# Patient Record
Sex: MALE | Race: WHITE | NOT HISPANIC OR LATINO | Employment: OTHER | URBAN - METROPOLITAN AREA
[De-identification: names, ages, dates, MRNs, and addresses within clinical notes are randomized per-mention and may not be internally consistent; named-entity substitution may affect disease eponyms.]

---

## 2018-10-12 ENCOUNTER — TRANSCRIBE ORDERS (OUTPATIENT)
Dept: SLEEP MEDICINE | Facility: HOSPITAL | Age: 33
End: 2018-10-12

## 2018-10-12 DIAGNOSIS — G47.10 HYPERSOMNIA: Primary | ICD-10-CM

## 2018-10-12 DIAGNOSIS — G25.81 RESTLESS LEGS SYNDROME (RLS): ICD-10-CM

## 2018-10-17 ENCOUNTER — HOSPITAL ENCOUNTER (OUTPATIENT)
Dept: SLEEP MEDICINE | Facility: HOSPITAL | Age: 33
End: 2018-10-17

## 2019-09-12 ENCOUNTER — OFFICE VISIT (OUTPATIENT)
Dept: PSYCHIATRY | Facility: CLINIC | Age: 34
End: 2019-09-12

## 2019-09-12 DIAGNOSIS — F31.60 BIPOLAR AFFECTIVE DISORDER, MIXED (HCC): Primary | ICD-10-CM

## 2019-09-12 PROCEDURE — 90792 PSYCH DIAG EVAL W/MED SRVCS: CPT | Performed by: PHYSICIAN ASSISTANT

## 2019-09-12 RX ORDER — VALACYCLOVIR HYDROCHLORIDE 500 MG/1
500 TABLET, FILM COATED ORAL 3 TIMES DAILY PRN
COMMUNITY
Start: 2018-10-03

## 2019-09-12 RX ORDER — LEVOTHYROXINE SODIUM 0.07 MG/1
1 TABLET ORAL
COMMUNITY
Start: 2019-08-18 | End: 2022-06-10 | Stop reason: SDUPTHER

## 2019-09-12 RX ORDER — ARIPIPRAZOLE 10 MG/1
10 TABLET ORAL DAILY
COMMUNITY
Start: 2019-01-18 | End: 2019-09-12 | Stop reason: ALTCHOICE

## 2019-09-12 NOTE — PROGRESS NOTES
Subjective   Basil Alarcon is a 33 y.o.white male who presents today for initial evaluation     Chief Complaint:  Fatigue, yvonne, depression    History of Present Illness:   Referred here by Dr. Johnnie Nance  He and his wife are trying to have a baby,  x 1 year  Was going to counseling in [place] who pointed out he might be bipolar, stopped counseling in November  Fatigue/low energy/low motivation initially and found he was hypothyroid  Episodes where he felt great, restless, sleeping only one or two hours, irritable, wasn't himself, lasted at least two weeks then crashed into depression with no reasons  He is not on any medication at this point, no extreme highs and lows   Works at [employer], works swing shifts  Depression 0/10 currently  Denies SI/HI  Last manic episode from October to November, spent savings, bought a new truck, max out credit cards                The following portions of the patient's history were reviewed and updated as appropriate: allergies, current medications, past family history, past medical history, past social history, past surgical history and problem list.    PAST PSYCHIATRIC HISTORY  Axis I  Affective/Bipoloar Disorder  Axis II  None    PAST OUTPATIENT TREATMENT  Diagnosis treated:  Affective Disorder  Treatment Type:  Individual Therapy, Medication Management  Prior Psychiatric Medications:  Lexapro  Buspar   Abilfiy  Support Groups:  None  Sequelae Of Mental Disorder:  job disruption, family disruption, emotional distress, financial hardships      Interval History  Improved    Side Effects  Buspar triggered yvonne but helped his sexual side effects from Lexapro      Past Medical History:  Past Medical History:   Diagnosis Date   • Bipolar disorder (CMS/HCC)    • Disease of thyroid gland        Social History:  Social History     Socioeconomic History   • Marital status:      Spouse name: Not on file   • Number of children: Not on file   • Years of  education: Not on file   • Highest education level: Not on file   Tobacco Use   • Smoking status: Former Smoker   • Smokeless tobacco: Never Used   Substance and Sexual Activity   • Alcohol use: No     Frequency: Never   • Drug use: No   • Sexual activity: Yes       Family History:  Family History   Problem Relation Age of Onset   • Bipolar disorder Mother    • Bipolar disorder Sister        Past Surgical History:  History reviewed. No pertinent surgical history.    Problem List:  There is no problem list on file for this patient.      Allergy:   No Known Allergies     Discontinued Medications:  Medications Discontinued During This Encounter   Medication Reason   • ARIPiprazole (ABILIFY) 10 MG tablet Alternate therapy       Current Medications:   Current Outpatient Medications   Medication Sig Dispense Refill   • valACYclovir (VALTREX) 500 MG tablet Take 500 mg by mouth Daily.     • bismuth subsalicylate (PEPTO BISMOL) 262 MG/15ML suspension Take 15 mL by mouth.     • levothyroxine (SYNTHROID, LEVOTHROID) 75 MCG tablet        No current facility-administered medications for this visit.          Review of Symptoms:    Psychiatric/Behavioral: Negative for agitation, behavioral problems, confusion, decreased concentration, dysphoric mood, hallucinations, self-injury, sleep disturbance and suicidal ideas. The patient is not nervous/anxious and is not hyperactive.        Physical Exam:   There were no vitals taken for this visit.    Mental Status Exam:   Hygiene:   good  Cooperation:  Cooperative  Eye Contact:  Good  Psychomotor Behavior:  Appropriate  Affect:  Full range  Mood: normal  Hopelessness: Denies  Speech:  Normal  Thought Process:  Goal directed  Thought Content:  Normal  Suicidal:  None  Homicidal:  None  Hallucinations:  None  Delusion:  None  Memory:  Intact  Orientation:  Person, Place, Time and Situation  Reliability:  good  Insight:  Good  Judgement:  Good  Impulse Control:  Good  Physical/Medical  Issues:  Yes Hypothyroidism       PHQ-9 Depression Screening  Little interest or pleasure in doing things? 1   Feeling down, depressed, or hopeless? 0   Trouble falling or staying asleep, or sleeping too much?     Feeling tired or having little energy?     Poor appetite or overeating?     Feeling bad about yourself - or that you are a failure or have let yourself or your family down?     Trouble concentrating on things, such as reading the newspaper or watching television?     Moving or speaking so slowly that other people could have noticed? Or the opposite - being so fidgety or restless that you have been moving around a lot more than usual?     Thoughts that you would be better off dead, or of hurting yourself in some way?     PHQ-9 Total Score 1   If you checked off any problems, how difficult have these problems made it for you to do your work, take care of things at home, or get along with other people?             Former smoker    I advised Basil of the risks of tobacco use.     Lab Results:   No visits with results within 3 Month(s) from this visit.   Latest known visit with results is:   No results found for any previous visit.       Assessment/Plan   Problems Addressed this Visit     None      Visit Diagnoses     Bipolar affective disorder, mixed (CMS/[facility])    -  Primary          Visit Diagnoses:    ICD-10-CM ICD-9-CM   1. Bipolar affective disorder, mixed (CMS/HCC) F31.60 296.60       TREATMENT PLAN/GOALS: Continue supportive psychotherapy efforts and medications as indicated. Treatment and medication options discussed during today's visit. Patient ackowledged and verbally consented to continue with current treatment plan and was educated on the importance of compliance with treatment and follow-up appointments.    MEDICATION ISSUES:  INSPECT reviewed as expected  Discussed medication options and treatment plan of prescribed medication as well as the risks, benefits, and side effects including potential  falls, possible impaired driving and metabolic adversities among others. Patient is agreeable to call the office with any worsening of symptoms or onset of side effects. Patient is agreeable to call 911 or go to the nearest ER should he/she begin having SI/HI. No medication side effects or related complaints today.     Patient reports that his mood is currently fairly well controlled, not on any medications, no recent manic symptoms, denies feeling depressed.  He would like to try medication to prevent any major swings in his mood again but very nervous about it.  FaceRig testing done to help evaluate the best practice medications for him, will call him to discuss and decide which meds to start.    MEDS ORDERED DURING VISIT:  No orders of the defined types were placed in this encounter.      Return in about 3 months (around 12/12/2019).         This document has been electronically signed by Kristy Reese PA-C  September 12, 2019 10:01 AM

## 2019-09-27 ENCOUNTER — TELEPHONE (OUTPATIENT)
Dept: PSYCHIATRY | Facility: CLINIC | Age: 34
End: 2019-09-27

## 2019-09-27 NOTE — TELEPHONE ENCOUNTER
Please call patient and let him know that we have a few medication options for the bipolar based on his iexerci.se results, Vraylar, Latuda or Geodon.  There are many of the meds, including Abilify that had moderated to significant interactiolns and should be avoided. I know he is reluctant to start meds, so let me know if he would like me to send an Rx of a med to get started.

## 2019-10-01 ENCOUNTER — PATIENT MESSAGE (OUTPATIENT)
Dept: PSYCHIATRY | Facility: CLINIC | Age: 34
End: 2019-10-01

## 2019-10-29 ENCOUNTER — PATIENT MESSAGE (OUTPATIENT)
Dept: PSYCHIATRY | Facility: CLINIC | Age: 34
End: 2019-10-29

## 2019-10-29 NOTE — TELEPHONE ENCOUNTER
From: Basil Alarcon  To: Kristy Reese PA-C  Sent: 10/1/2019 11:55 AM EDT  Subject: Test Results Question    Hello,    I viewed the test results from the gene testing. We did speak of other forms of medications which are not on the list. I believe the test results are for antidepressants and we were discussing mood stabilizers and bi polar medocation. I researched the antidepressants however. The Emsam transdermal is the only medication that appears promising. I am mostly basing this from user reports and it seems to have the better results out of the four from the list (lowest amount side effects).    If antidepressants are where you suggest starting could we begin with the Epsam transdermal?    Thank you Basil Schmitt

## 2020-02-10 ENCOUNTER — TELEPHONE (OUTPATIENT)
Dept: PSYCHIATRY | Facility: CLINIC | Age: 35
End: 2020-02-10

## 2020-02-10 DIAGNOSIS — F31.32 BIPOLAR AFFECTIVE DISORDER, CURRENTLY DEPRESSED, MODERATE (HCC): Primary | ICD-10-CM

## 2020-02-10 NOTE — TELEPHONE ENCOUNTER
Per Provider Hugh kaur for patient to  samples of vraylar 1.5 mg .Patient came in to  samples 3 week supply of vraylar

## 2020-02-27 ENCOUNTER — OFFICE VISIT (OUTPATIENT)
Dept: PSYCHIATRY | Facility: CLINIC | Age: 35
End: 2020-02-27

## 2020-02-27 DIAGNOSIS — F51.05 INSOMNIA DUE TO MENTAL DISORDER: ICD-10-CM

## 2020-02-27 DIAGNOSIS — F31.60 BIPOLAR AFFECTIVE DISORDER, MIXED (HCC): Primary | ICD-10-CM

## 2020-02-27 DIAGNOSIS — F41.1 GENERALIZED ANXIETY DISORDER: ICD-10-CM

## 2020-02-27 PROBLEM — A60.00 GENITAL HERPES: Status: ACTIVE | Noted: 2019-02-08

## 2020-02-27 PROBLEM — E03.9 HYPOTHYROIDISM, ADULT: Status: ACTIVE | Noted: 2018-08-28

## 2020-02-27 PROCEDURE — 99213 OFFICE O/P EST LOW 20 MIN: CPT | Performed by: PHYSICIAN ASSISTANT

## 2020-02-27 RX ORDER — OXCARBAZEPINE 150 MG/1
150 TABLET, FILM COATED ORAL 2 TIMES DAILY
Qty: 60 TABLET | Refills: 3 | Status: SHIPPED | OUTPATIENT
Start: 2020-02-27 | End: 2020-04-28 | Stop reason: SDUPTHER

## 2020-02-27 RX ORDER — TRAZODONE HYDROCHLORIDE 50 MG/1
TABLET ORAL
Qty: 60 TABLET | Refills: 3 | Status: SHIPPED | OUTPATIENT
Start: 2020-02-27 | End: 2020-05-20

## 2020-02-27 NOTE — PROGRESS NOTES
Subjective   Basil Alarcon is a 34 y.o.white male who presents today for first follow up    Chief Complaint:  Fatigue, yvonne, depression    History of Present Illness:   Vraylar helping calm him a lot, feels better, no side effects, would like to keep it at 1.5mg for now  Trouble sleeping, still manic episodes, mood swings  He and his wife are trying to have a baby,  x 1 year  Was going to counseling in [place] who pointed out he might be bipolar, stopped counseling in November  Depression 3/10  Works at [employer], works swing shifts, supervisor, stressful job  Denies SI/HI  Last severe manic episode from October to November, spent savings, bought a new truck, max out credit cards                The following portions of the patient's history were reviewed and updated as appropriate: allergies, current medications, past family history, past medical history, past social history, past surgical history and problem list.    PAST PSYCHIATRIC HISTORY  Axis I  Affective/Bipoloar Disorder, Anxiety Disorder  Axis II  None    PAST OUTPATIENT TREATMENT  Diagnosis treated:  Affective Disorder, Anxiety Disorder  Treatment Type:  Individual Therapy, Medication Management  Prior Psychiatric Medications:  Prozac, depression worse  Paxil  Lexapro  Buspar   Abilfiy  Ambien, felt weird  Support Groups:  None  Sequelae Of Mental Disorder:  job disruption, family disruption, emotional distress, financial hardships      Interval History  Improved    Side Effects  Buspar triggered yvonne but helped his sexual side effects from Lexapro    Past psychiatric history reviewed and compared to 9/12/1 on visit and appropriate updates were made.    Past Medical History:  Past Medical History:   Diagnosis Date   • Bipolar disorder (CMS/[facility])    • Disease of thyroid gland        Social History:  Social History     Socioeconomic History   • Marital status:      Spouse name: Not on file   • Number of children: Not on file   •  Years of education: Not on file   • Highest education level: Not on file   Tobacco Use   • Smoking status: Former Smoker   • Smokeless tobacco: Never Used   Substance and Sexual Activity   • Alcohol use: No     Frequency: Never   • Drug use: No   • Sexual activity: Yes       Family History:  Family History   Problem Relation Age of Onset   • Bipolar disorder Mother    • Bipolar disorder Sister        Past Surgical History:  History reviewed. No pertinent surgical history.    Problem List:  Patient Active Problem List   Diagnosis   • Generalized anxiety disorder   • Genital herpes   • Hypercholesterolemia   • Hypothyroidism, adult       Allergy:   No Known Allergies     Discontinued Medications:  Medications Discontinued During This Encounter   Medication Reason   • Cariprazine HCl (VRAYLAR) 1.5 MG capsule capsule Reorder       Current Medications:   Current Outpatient Medications   Medication Sig Dispense Refill   • bismuth subsalicylate (PEPTO BISMOL) 262 MG/15ML suspension Take 15 mL by mouth.     • Cariprazine HCl (VRAYLAR) 1.5 MG capsule capsule Take 1 capsule by mouth Daily. 30 capsule 3   • levothyroxine (SYNTHROID, LEVOTHROID) 75 MCG tablet      • OXcarbazepine (TRILEPTAL) 150 MG tablet Take 1 tablet by mouth 2 (Two) Times a Day. 60 tablet 3   • traZODone (DESYREL) 50 MG tablet Take one or two at bedtime for sleep 60 tablet 3   • valACYclovir (VALTREX) 500 MG tablet Take 500 mg by mouth Daily.       No current facility-administered medications for this visit.          Review of Symptoms:    Psychiatric/Behavioral: Negative for agitation, behavioral problems, confusion, decreased concentration, dysphoric mood, hallucinations, self-injury, sleep disturbance and suicidal ideas. The patient is not nervous/anxious and is not hyperactive.        Physical Exam:   There were no vitals taken for this visit.    Mental Status Exam:   Hygiene:   good  Cooperation:  Cooperative  Eye Contact:  Good  Psychomotor Behavior:   Appropriate  Affect:  Full range  Mood: normal  Hopelessness: Denies  Speech:  Normal  Thought Process:  Goal directed  Thought Content:  Normal  Suicidal:  None  Homicidal:  None  Hallucinations:  None  Delusion:  None  Memory:  Intact  Orientation:  Person, Place, Time and Situation  Reliability:  good  Insight:  Good  Judgement:  Good  Impulse Control:  Good  Physical/Medical Issues:  Yes Hypothyroidism     Mental status exam was reviewed and compared to 9/12/1 on visit and no updates were necessary, the exam was the same.    PHQ-9 Depression Screening  Little interest or pleasure in doing things? 1   Feeling down, depressed, or hopeless? 1   Trouble falling or staying asleep, or sleeping too much? 2   Feeling tired or having little energy? 1   Poor appetite or overeating? 0   Feeling bad about yourself - or that you are a failure or have let yourself or your family down? 1   Trouble concentrating on things, such as reading the newspaper or watching television? 1   Moving or speaking so slowly that other people could have noticed? Or the opposite - being so fidgety or restless that you have been moving around a lot more than usual? 0   Thoughts that you would be better off dead, or of hurting yourself in some way? 0   PHQ-9 Total Score 7   If you checked off any problems, how difficult have these problems made it for you to do your work, take care of things at home, or get along with other people? Somewhat difficult           Former smoker    I advised Basil of the risks of tobacco use.     Lab Results:   No visits with results within 3 Month(s) from this visit.   Latest known visit with results is:   No results found for any previous visit.       Assessment/Plan   Problems Addressed this Visit        Other    Generalized anxiety disorder    Relevant Medications    traZODone (DESYREL) 50 MG tablet    Cariprazine HCl (VRAYLAR) 1.5 MG capsule capsule      Other Visit Diagnoses     Bipolar affective disorder, mixed  (CMS/[facility])    -  Primary    Relevant Medications    traZODone (DESYREL) 50 MG tablet    OXcarbazepine (TRILEPTAL) 150 MG tablet    Cariprazine HCl (VRAYLAR) 1.5 MG capsule capsule    Insomnia due to mental disorder        Relevant Medications    traZODone (DESYREL) 50 MG tablet    Cariprazine HCl (VRAYLAR) 1.5 MG capsule capsule          Visit Diagnoses:    ICD-10-CM ICD-9-CM   1. Bipolar affective disorder, mixed (CMS/[facility]) F31.60 296.60   2. Generalized anxiety disorder F41.1 300.02   3. Insomnia due to mental disorder F51.05 300.9     327.02       TREATMENT PLAN/GOALS: Continue supportive psychotherapy efforts and medications as indicated. Treatment and medication options discussed during today's visit. Patient ackowledged and verbally consented to continue with current treatment plan and was educated on the importance of compliance with treatment and follow-up appointments.    MEDICATION ISSUES:  INSPECT reviewed as expected  Discussed medication options and treatment plan of prescribed medication as well as the risks, benefits, and side effects including potential falls, possible impaired driving and metabolic adversities among others. Patient is agreeable to call the office with any worsening of symptoms or onset of side effects. Patient is agreeable to call 911 or go to the nearest ER should he/she begin having SI/HI. No medication side effects or related complaints today.     [employer] results were reviewed and he was given a copy of his results.  Continue Vraylar at  1.5 mg daily  Add Trileptal 150 mg, take 1 tablet daily for 2 weeks then increase to twice daily  Trazodone 50 mg tablets, take 1 or 2 at bedtime for sleep  Patient is a reduced converter of folic acid, will consider supplementation with l-methylfolate down the road.    MEDS ORDERED DURING VISIT:  New Medications Ordered This Visit   Medications   • traZODone (DESYREL) 50 MG tablet     Sig: Take one or two at bedtime for sleep     Dispense:  60  tablet     Refill:  3   • OXcarbazepine (TRILEPTAL) 150 MG tablet     Sig: Take 1 tablet by mouth 2 (Two) Times a Day.     Dispense:  60 tablet     Refill:  3   • Cariprazine HCl (VRAYLAR) 1.5 MG capsule capsule     Sig: Take 1 capsule by mouth Daily.     Dispense:  30 capsule     Refill:  3       Return in about 3 months (around 5/27/2020).         This document has been electronically signed by Kristy Reese PA-C  February 27, 2020 8:39 AM

## 2020-04-28 ENCOUNTER — PATIENT MESSAGE (OUTPATIENT)
Dept: PSYCHIATRY | Facility: CLINIC | Age: 35
End: 2020-04-28

## 2020-04-28 DIAGNOSIS — F31.60 BIPOLAR AFFECTIVE DISORDER, MIXED (HCC): ICD-10-CM

## 2020-04-28 RX ORDER — OXCARBAZEPINE 300 MG/1
300 TABLET, FILM COATED ORAL 2 TIMES DAILY
Qty: 60 TABLET | Refills: 2 | Status: SHIPPED | OUTPATIENT
Start: 2020-04-28 | End: 2020-07-01 | Stop reason: SDUPTHER

## 2020-04-28 NOTE — TELEPHONE ENCOUNTER
From: Basil Alarcon  To: Kristy Reese PA-C  Sent: 4/28/2020 4:45 AM EDT  Subject: Non-Urgent Medical Question    Hello,    I believe the mdeication is working but i feel i am still experiencing symptoms regularly. They are not bad, but i wanted to know of we could try increasing the dose of the medicine i am on?    Thank you,  Basil

## 2020-05-20 ENCOUNTER — OFFICE VISIT (OUTPATIENT)
Dept: PSYCHIATRY | Facility: CLINIC | Age: 35
End: 2020-05-20

## 2020-05-20 DIAGNOSIS — F31.60 BIPOLAR AFFECTIVE DISORDER, MIXED (HCC): Primary | ICD-10-CM

## 2020-05-20 DIAGNOSIS — F51.05 INSOMNIA DUE TO MENTAL DISORDER: ICD-10-CM

## 2020-05-20 DIAGNOSIS — F41.1 GENERALIZED ANXIETY DISORDER: ICD-10-CM

## 2020-05-20 PROCEDURE — 99442 PR PHYS/QHP TELEPHONE EVALUATION 11-20 MIN: CPT | Performed by: PHYSICIAN ASSISTANT

## 2020-05-20 NOTE — PROGRESS NOTES
"Ramo Alarcon is a 34 y.o.white male who presents today for first follow up    You have chosen to receive care through a telephone visit. Do you consent to use a telephone visit for your medical care today? Yes    Chief Complaint:  Meds working well, bipolar significantly improve    History of Present Illness:   Vraylar helping calm him a lot, feels better, no side effects, increased to 3mg a month ago  Sleeping \"like a baby\"  No yvonne, no mood swings  Depression 0-2/10  Anxiety was sometimes overwhelming but better in the last wk.  Works at [employer], works swing shifts, supervisor, stressful job  Denies SI/HI  He never did take the Trazodone and now not needed.  Last severe manic episode from October to November 2019, spent savings, bought a new truck, max out credit cards                The following portions of the patient's history were reviewed and updated as appropriate: allergies, current medications, past family history, past medical history, past social history, past surgical history and problem list.    PAST PSYCHIATRIC HISTORY  Axis I  Affective/Bipoloar Disorder, Anxiety Disorder  Axis II  None    PAST OUTPATIENT TREATMENT  Diagnosis treated:  Affective Disorder, Anxiety Disorder  Treatment Type:  Individual Therapy, Medication Management  Prior Psychiatric Medications:  Prozac, depression worse  Paxil  Lexapro  Buspar   Abilfiy  Ambien, felt weird  Vraylar  Trileptal  Support Groups:  None  Sequelae Of Mental Disorder:  job disruption, family disruption, emotional distress, financial hardships      Interval History  Improved    Side Effects  Buspar triggered yvonne but helped his sexual side effects from Lexapro    Past psychiatric history reviewed and compared to 2/27/20 visit and appropriate updates were made.    Past Medical History:  Past Medical History:   Diagnosis Date   • Bipolar disorder (CMS/[facility])    • Disease of thyroid gland        Social History:  Social History "     Socioeconomic History   • Marital status:      Spouse name: Not on file   • Number of children: Not on file   • Years of education: Not on file   • Highest education level: Not on file   Tobacco Use   • Smoking status: Former Smoker   • Smokeless tobacco: Never Used   Substance and Sexual Activity   • Alcohol use: No     Frequency: Never   • Drug use: No   • Sexual activity: Yes       Family History:  Family History   Problem Relation Age of Onset   • Bipolar disorder Mother    • Bipolar disorder Sister        Past Surgical History:  History reviewed. No pertinent surgical history.    Problem List:  Patient Active Problem List   Diagnosis   • Generalized anxiety disorder   • Genital herpes   • Hypercholesterolemia   • Hypothyroidism, adult       Allergy:   No Known Allergies     Discontinued Medications:  Medications Discontinued During This Encounter   Medication Reason   • traZODone (DESYREL) 50 MG tablet *Therapy completed       Current Medications:   Current Outpatient Medications   Medication Sig Dispense Refill   • bismuth subsalicylate (PEPTO BISMOL) 262 MG/15ML suspension Take 15 mL by mouth.     • Cariprazine HCl (VRAYLAR) 3 MG capsule capsule Take 1 capsule by mouth Daily. 30 capsule 2   • levothyroxine (SYNTHROID, LEVOTHROID) 75 MCG tablet      • OXcarbazepine (TRILEPTAL) 300 MG tablet Take 1 tablet by mouth 2 (Two) Times a Day. 60 tablet 2   • valACYclovir (VALTREX) 500 MG tablet Take 500 mg by mouth Daily.       No current facility-administered medications for this visit.          Review of Symptoms:    Psychiatric/Behavioral: Negative for agitation, behavioral problems, confusion, decreased concentration, dysphoric mood, hallucinations, self-injury, sleep disturbance and suicidal ideas. The patient is not nervous/anxious and is not hyperactive.        Physical Exam:   There were no vitals taken for this visit.    Mental Status Exam:   Hygiene:   Unable to assess via telephone  Cooperation:   Cooperative  Eye Contact:  No eye contact via telephone  Psychomotor Behavior:  Appropriate  Affect:  Full range  Mood: normal  Hopelessness: Denies  Speech:  Normal  Thought Process:  Goal directed  Thought Content:  Normal  Suicidal:  None  Homicidal:  None  Hallucinations:  None  Delusion:  None  Memory:  Intact  Orientation:  Person, Place, Time and Situation  Reliability:  good  Insight:  Good  Judgement:  Good  Impulse Control:  Good  Physical/Medical Issues:  Yes Hypothyroidism     Mental status exam was reviewed and compared to 2/27/20 visit and appropriate updates were made.    PHQ-9 Depression Screening  Little interest or pleasure in doing things? 0   Feeling down, depressed, or hopeless? 0   Trouble falling or staying asleep, or sleeping too much?     Feeling tired or having little energy?     Poor appetite or overeating?     Feeling bad about yourself - or that you are a failure or have let yourself or your family down?     Trouble concentrating on things, such as reading the newspaper or watching television?     Moving or speaking so slowly that other people could have noticed? Or the opposite - being so fidgety or restless that you have been moving around a lot more than usual?     Thoughts that you would be better off dead, or of hurting yourself in some way?     PHQ-9 Total Score 0   If you checked off any problems, how difficult have these problems made it for you to do your work, take care of things at home, or get along with other people?             Former smoker    I advised Basil of the risks of tobacco use.     Lab Results:   No visits with results within 3 Month(s) from this visit.   Latest known visit with results is:   No results found for any previous visit.       Assessment/Plan   Problems Addressed this Visit        Other    Generalized anxiety disorder      Other Visit Diagnoses     Bipolar affective disorder, mixed (CMS/HCC)    -  Primary    Insomnia due to mental disorder               Visit Diagnoses:    ICD-10-CM ICD-9-CM   1. Bipolar affective disorder, mixed (CMS/[facility]) F31.60 296.60   2. Generalized anxiety disorder F41.1 300.02   3. Insomnia due to mental disorder F51.05 300.9     327.02       TREATMENT PLAN/GOALS: Continue supportive psychotherapy efforts and medications as indicated. Treatment and medication options discussed during today's visit. Patient ackowledged and verbally consented to continue with current treatment plan and was educated on the importance of compliance with treatment and follow-up appointments.    MEDICATION ISSUES:  INSPECT reviewed as expected  Discussed medication options and treatment plan of prescribed medication as well as the risks, benefits, and side effects including potential falls, possible impaired driving and metabolic adversities among others. Patient is agreeable to call the office with any worsening of symptoms or onset of side effects. Patient is agreeable to call 911 or go to the nearest ER should he/she begin having SI/HI. No medication side effects or related complaints today.     Patient is doing great on Vraylar 3mg and Trileptal 300mg BID.  No yvonne, sleeping well, no need for changes.  Continue current meds with no refills needed today.  Patient is a reduced converter of folic acid, will consider supplementation with l-methylfolate down the road.    MEDS ORDERED DURING VISIT:  No orders of the defined types were placed in this encounter.      Return in about 3 months (around 8/20/2020).    This visit has been rescheduled as a phone visit to comply with patient safety concerns in accordance with CDC recommendations. Total time of discussion was 15 minutes.      This document has been electronically signed by Kristy Reese PA-C  May 20, 2020 08:30

## 2020-06-30 ENCOUNTER — PATIENT MESSAGE (OUTPATIENT)
Dept: PSYCHIATRY | Facility: CLINIC | Age: 35
End: 2020-06-30

## 2020-06-30 DIAGNOSIS — F31.60 BIPOLAR AFFECTIVE DISORDER, MIXED (HCC): ICD-10-CM

## 2020-07-01 RX ORDER — OXCARBAZEPINE 300 MG/1
300 TABLET, FILM COATED ORAL 2 TIMES DAILY
Qty: 60 TABLET | Refills: 2 | Status: SHIPPED | OUTPATIENT
Start: 2020-07-01 | End: 2020-10-04

## 2020-07-01 RX ORDER — HYDROXYZINE PAMOATE 25 MG/1
25 CAPSULE ORAL 3 TIMES DAILY PRN
Qty: 90 CAPSULE | Refills: 2 | Status: SHIPPED | OUTPATIENT
Start: 2020-07-01 | End: 2020-09-29

## 2020-07-01 NOTE — TELEPHONE ENCOUNTER
From: Basil Alarcon  To: Kristy Reese PA-C  Sent: 6/30/2020 4:09 PM EDT  Subject: Visit Follow-Up Question    Hi Dr. Reese,    I hope you are well. I have two questions. First, last we spoke over the phone you mentioned I could try an anti anxiety medicine. Would that still be an option for anxiety?    Second, could you send my perscriptions to the [facility] on [facility]? [address] [place] [address] is the address.    Thank you and have a nice day,  Basil

## 2020-07-28 ENCOUNTER — PATIENT MESSAGE (OUTPATIENT)
Dept: PSYCHIATRY | Facility: CLINIC | Age: 35
End: 2020-07-28

## 2020-07-28 DIAGNOSIS — F41.1 GENERALIZED ANXIETY DISORDER: Primary | ICD-10-CM

## 2020-08-05 DIAGNOSIS — F31.60 BIPOLAR AFFECTIVE DISORDER, MIXED (HCC): Primary | ICD-10-CM

## 2020-08-10 RX ORDER — CLONAZEPAM 0.5 MG/1
0.5 TABLET ORAL 2 TIMES DAILY PRN
Qty: 60 TABLET | Refills: 0 | Status: SHIPPED | OUTPATIENT
Start: 2020-08-10 | End: 2021-08-19 | Stop reason: SDUPTHER

## 2020-08-10 NOTE — TELEPHONE ENCOUNTER
From: Basil Alarcon  To: Kristy Reese PA-C  Sent: 7/28/2020 6:09 PM EDT  Subject: Prescription Question    I have been dealing with a low heart rate and low blood pressure since I started taking the Vraylar 3mg (it may be something else?). My regular doctor asked that we go back to 1.5 to see if there is an improvement. I cannot say why or where the issue is coming from.    What do you think?    Thank you,  Basil

## 2020-08-17 ENCOUNTER — OFFICE VISIT (OUTPATIENT)
Dept: PSYCHIATRY | Facility: CLINIC | Age: 35
End: 2020-08-17

## 2020-08-17 DIAGNOSIS — F41.1 GENERALIZED ANXIETY DISORDER: ICD-10-CM

## 2020-08-17 DIAGNOSIS — F51.05 INSOMNIA DUE TO MENTAL DISORDER: ICD-10-CM

## 2020-08-17 DIAGNOSIS — F31.60 BIPOLAR AFFECTIVE DISORDER, MIXED (HCC): Primary | ICD-10-CM

## 2020-08-17 PROCEDURE — 99213 OFFICE O/P EST LOW 20 MIN: CPT | Performed by: PHYSICIAN ASSISTANT

## 2020-08-17 NOTE — PROGRESS NOTES
Subjective   Basil Alarcon is a 34 y.o.white male who presents today for first follow up    You have chosen to receive care through a telephone visit. Do you consent to use a telephone visit for your medical care today? Yes    Chief Complaint:  Became rather manic a week ago when Vraylar was increased but better now    History of Present Illness:   Wife left him, came back but sleeping in the basement, breaking up, he is ok with it  Vraylar was decreased to 1.5 mg per recommendation of his PCP, adjusting to the dosage now.  Passed out at work, was having panic attacks  Depression 3/10  Anxiety was sometimes overwhelming but better in the last wk.  Works at [employer], works swing shifts, supervisor, stressful job  Denies SI/HI  Klonopin is helping his anxiety a lot, only taking as needed               The following portions of the patient's history were reviewed and updated as appropriate: allergies, current medications, past family history, past medical history, past social history, past surgical history and problem list.    PAST PSYCHIATRIC HISTORY  Axis I  Affective/Bipoloar Disorder, Anxiety Disorder  Axis II  None    PAST OUTPATIENT TREATMENT  Diagnosis treated:  Affective Disorder, Anxiety Disorder  Treatment Type:  Individual Therapy, Medication Management  Prior Psychiatric Medications:  Prozac, depression worse  Paxil  Lexapro  Buspar   Abilfiy  Ambien, felt weird  Vraylar  Trileptal  Klonopin  Support Groups:  None  Sequelae Of Mental Disorder:  job disruption, family disruption, emotional distress, financial hardships      Interval History  Improved    Side Effects  Buspar triggered yvonne but helped his sexual side effects from Lexapro    Past psychiatric history reviewed and compared to 5/20/20 visit and appropriate updates were made.    Past Medical History:  Past Medical History:   Diagnosis Date   • Bipolar disorder (CMS/HCC)    • Disease of thyroid gland        Social History:  Social History      Socioeconomic History   • Marital status:      Spouse name: Not on file   • Number of children: Not on file   • Years of education: Not on file   • Highest education level: Not on file   Tobacco Use   • Smoking status: Former Smoker   • Smokeless tobacco: Never Used   Substance and Sexual Activity   • Alcohol use: No     Frequency: Never   • Drug use: No   • Sexual activity: Yes       Family History:  Family History   Problem Relation Age of Onset   • Bipolar disorder Mother    • Bipolar disorder Sister        Past Surgical History:  History reviewed. No pertinent surgical history.    Problem List:  Patient Active Problem List   Diagnosis   • Generalized anxiety disorder   • Genital herpes   • Hypercholesterolemia   • Hypothyroidism, adult       Allergy:   No Known Allergies     Discontinued Medications:  There are no discontinued medications.    Current Medications:   Current Outpatient Medications   Medication Sig Dispense Refill   • bismuth subsalicylate (PEPTO BISMOL) 262 MG/15ML suspension Take 15 mL by mouth.     • Cariprazine HCl (Vraylar) 1.5 MG capsule capsule Take 1 capsule by mouth Daily. 30 capsule 2   • clonazePAM (KlonoPIN) 0.5 MG tablet Take 1 tablet by mouth 2 (Two) Times a Day As Needed for Anxiety. 60 tablet 0   • hydrOXYzine pamoate (Vistaril) 25 MG capsule Take 1 capsule by mouth 3 (Three) Times a Day As Needed for Anxiety. 90 capsule 2   • levothyroxine (SYNTHROID, LEVOTHROID) 75 MCG tablet      • OXcarbazepine (TRILEPTAL) 300 MG tablet Take 1 tablet by mouth 2 (Two) Times a Day. 60 tablet 2   • valACYclovir (VALTREX) 500 MG tablet Take 500 mg by mouth Daily.       No current facility-administered medications for this visit.          Review of Symptoms:    Psychiatric/Behavioral: Negative for agitation, behavioral problems, confusion, decreased concentration, dysphoric mood, hallucinations, self-injury, sleep disturbance and suicidal ideas. The patient is not nervous/anxious and is  not hyperactive.        Physical Exam:   There were no vitals taken for this visit.    Mental Status Exam:   Hygiene:   Unable to assess via telephone  Cooperation:  Cooperative  Eye Contact:  No eye contact via telephone  Psychomotor Behavior:  Appropriate  Affect:  Full range  Mood: normal  Hopelessness: Denies  Speech:  Normal  Thought Process:  Goal directed  Thought Content:  Normal  Suicidal:  None  Homicidal:  None  Hallucinations:  None  Delusion:  None  Memory:  Intact  Orientation:  Person, Place, Time and Situation  Reliability:  good  Insight:  Good  Judgement:  Good  Impulse Control:  Good  Physical/Medical Issues:  Yes Hypothyroidism     Mental status exam was reviewed and compared to 5/20/20 visit and appropriate updates were made.    PHQ-9 Depression Screening  Little interest or pleasure in doing things? 0   Feeling down, depressed, or hopeless? 1   Trouble falling or staying asleep, or sleeping too much?     Feeling tired or having little energy?     Poor appetite or overeating?     Feeling bad about yourself - or that you are a failure or have let yourself or your family down?     Trouble concentrating on things, such as reading the newspaper or watching television?     Moving or speaking so slowly that other people could have noticed? Or the opposite - being so fidgety or restless that you have been moving around a lot more than usual?     Thoughts that you would be better off dead, or of hurting yourself in some way?     PHQ-9 Total Score 1   If you checked off any problems, how difficult have these problems made it for you to do your work, take care of things at home, or get along with other people?             Former smoker    I advised Basil of the risks of tobacco use.     Lab Results:   No visits with results within 3 Month(s) from this visit.   Latest known visit with results is:   No results found for any previous visit.       Assessment/Plan   Problems Addressed this Visit        Other     Generalized anxiety disorder      Other Visit Diagnoses     Bipolar affective disorder, mixed (CMS/HCC)    -  Primary    Insomnia due to mental disorder              Visit Diagnoses:    ICD-10-CM ICD-9-CM   1. Bipolar affective disorder, mixed (CMS/HCC) F31.60 296.60   2. Generalized anxiety disorder F41.1 300.02   3. Insomnia due to mental disorder F51.05 300.9     327.02       TREATMENT PLAN/GOALS: Continue supportive psychotherapy efforts and medications as indicated. Treatment and medication options discussed during today's visit. Patient ackowledged and verbally consented to continue with current treatment plan and was educated on the importance of compliance with treatment and follow-up appointments.    MEDICATION ISSUES:  INSPECT reviewed as expected  Discussed medication options and treatment plan of prescribed medication as well as the risks, benefits, and side effects including potential falls, possible impaired driving and metabolic adversities among others. Patient is agreeable to call the office with any worsening of symptoms or onset of side effects. Patient is agreeable to call 911 or go to the nearest ER should he/she begin having SI/HI. No medication side effects or related complaints today.     Patient is doing better now, had some manic symptoms when the Vraylar was decreased to 1.5 mg, but has adjusted now.    Continue Vraylar at 1.5 mg, Trileptal 300 twice daily, Klonopin as needed and Vistaril as needed, no refills needed today.  Patient is a reduced converter of folic acid, will consider supplementation with l-methylfolate down the road.    MEDS ORDERED DURING VISIT:  No orders of the defined types were placed in this encounter.      Return in about 4 months (around 12/17/2020).    This visit has been rescheduled as a phone visit to comply with patient safety concerns in accordance with CDC recommendations. Total time of discussion was 15 minutes.      This document has been electronically  signed by Kristy Reese PA-C  August 17, 2020 09:09

## 2020-09-06 ENCOUNTER — PATIENT MESSAGE (OUTPATIENT)
Dept: PSYCHIATRY | Facility: CLINIC | Age: 35
End: 2020-09-06

## 2020-09-06 DIAGNOSIS — F31.60 BIPOLAR AFFECTIVE DISORDER, MIXED (HCC): ICD-10-CM

## 2020-09-08 NOTE — TELEPHONE ENCOUNTER
From: Basil Alarcon  To: Kristy Reese PA-C  Sent: 9/6/2020 7:26 PM EDT  Subject: Prescription Question    Hello,    The Vraylar 1.5 mg went to the wrong pharmacy. Unfortunately, I cannot make it within their hours of operation to pick it up. Could you send it to the [facility] at [address] [place] [address]?    Thank you,  Basil

## 2020-09-29 RX ORDER — HYDROXYZINE PAMOATE 25 MG/1
25 CAPSULE ORAL 3 TIMES DAILY PRN
Qty: 90 CAPSULE | Refills: 2 | Status: SHIPPED | OUTPATIENT
Start: 2020-09-29 | End: 2022-03-18

## 2020-10-02 DIAGNOSIS — F31.60 BIPOLAR AFFECTIVE DISORDER, MIXED (HCC): ICD-10-CM

## 2020-10-04 RX ORDER — OXCARBAZEPINE 300 MG/1
TABLET, FILM COATED ORAL
Qty: 60 TABLET | Refills: 2 | Status: SHIPPED | OUTPATIENT
Start: 2020-10-04 | End: 2020-11-05

## 2020-11-05 DIAGNOSIS — F31.60 BIPOLAR AFFECTIVE DISORDER, MIXED (HCC): ICD-10-CM

## 2020-11-05 RX ORDER — OXCARBAZEPINE 300 MG/1
TABLET, FILM COATED ORAL
Qty: 60 TABLET | Refills: 2 | Status: SHIPPED | OUTPATIENT
Start: 2020-11-05 | End: 2021-03-29

## 2021-02-18 DIAGNOSIS — F31.60 BIPOLAR AFFECTIVE DISORDER, MIXED (HCC): ICD-10-CM

## 2021-02-18 RX ORDER — OXCARBAZEPINE 300 MG/1
TABLET, FILM COATED ORAL
Qty: 180 TABLET | Refills: 0 | OUTPATIENT
Start: 2021-02-18

## 2021-03-27 DIAGNOSIS — F31.60 BIPOLAR AFFECTIVE DISORDER, MIXED (HCC): ICD-10-CM

## 2021-03-27 DIAGNOSIS — F41.1 GENERALIZED ANXIETY DISORDER: ICD-10-CM

## 2021-03-29 RX ORDER — CLONAZEPAM 0.5 MG/1
0.5 TABLET ORAL 2 TIMES DAILY PRN
Qty: 60 TABLET | Refills: 0 | OUTPATIENT
Start: 2021-03-29

## 2021-03-29 RX ORDER — OXCARBAZEPINE 300 MG/1
TABLET, FILM COATED ORAL
Qty: 180 TABLET | Refills: 0 | Status: SHIPPED | OUTPATIENT
Start: 2021-03-29 | End: 2021-06-24

## 2021-03-29 RX ORDER — OXCARBAZEPINE 300 MG/1
300 TABLET, FILM COATED ORAL 2 TIMES DAILY
Qty: 60 TABLET | Refills: 2 | OUTPATIENT
Start: 2021-03-29

## 2021-04-08 RX ORDER — ZIPRASIDONE HYDROCHLORIDE 20 MG/1
20 CAPSULE ORAL 2 TIMES DAILY WITH MEALS
Qty: 60 CAPSULE | Refills: 1 | Status: SHIPPED | OUTPATIENT
Start: 2021-04-08 | End: 2021-06-08

## 2021-04-09 DIAGNOSIS — F31.60 BIPOLAR AFFECTIVE DISORDER, MIXED (HCC): ICD-10-CM

## 2021-04-09 RX ORDER — CARIPRAZINE 1.5 MG/1
CAPSULE, GELATIN COATED ORAL
Qty: 30 CAPSULE | Refills: 2 | OUTPATIENT
Start: 2021-04-09

## 2021-06-08 RX ORDER — ZIPRASIDONE HYDROCHLORIDE 20 MG/1
CAPSULE ORAL
Qty: 60 CAPSULE | Refills: 1 | Status: SHIPPED | OUTPATIENT
Start: 2021-06-08 | End: 2021-08-11

## 2021-06-24 DIAGNOSIS — F31.60 BIPOLAR AFFECTIVE DISORDER, MIXED (HCC): ICD-10-CM

## 2021-06-24 RX ORDER — OXCARBAZEPINE 300 MG/1
TABLET, FILM COATED ORAL
Qty: 180 TABLET | Refills: 0 | Status: SHIPPED | OUTPATIENT
Start: 2021-06-24 | End: 2021-08-19 | Stop reason: SDUPTHER

## 2021-07-26 RX ORDER — CARIPRAZINE 1.5 MG/1
CAPSULE, GELATIN COATED ORAL
Qty: 30 CAPSULE | Refills: 2 | Status: SHIPPED | OUTPATIENT
Start: 2021-07-26 | End: 2021-08-19 | Stop reason: ALTCHOICE

## 2021-08-11 RX ORDER — ZIPRASIDONE HYDROCHLORIDE 20 MG/1
CAPSULE ORAL
Qty: 60 CAPSULE | Refills: 1 | Status: SHIPPED | OUTPATIENT
Start: 2021-08-11 | End: 2021-08-19 | Stop reason: DRUGHIGH

## 2021-08-19 ENCOUNTER — OFFICE VISIT (OUTPATIENT)
Dept: PSYCHIATRY | Facility: CLINIC | Age: 36
End: 2021-08-19

## 2021-08-19 DIAGNOSIS — F41.1 GENERALIZED ANXIETY DISORDER: Chronic | ICD-10-CM

## 2021-08-19 DIAGNOSIS — F31.60 BIPOLAR AFFECTIVE DISORDER, MIXED (HCC): Primary | Chronic | ICD-10-CM

## 2021-08-19 PROCEDURE — 99214 OFFICE O/P EST MOD 30 MIN: CPT | Performed by: PHYSICIAN ASSISTANT

## 2021-08-19 RX ORDER — CLONAZEPAM 0.5 MG/1
0.5 TABLET ORAL 2 TIMES DAILY PRN
Qty: 60 TABLET | Refills: 0 | Status: SHIPPED | OUTPATIENT
Start: 2021-08-19 | End: 2022-01-03

## 2021-08-19 RX ORDER — ZIPRASIDONE HYDROCHLORIDE 40 MG/1
40 CAPSULE ORAL 2 TIMES DAILY WITH MEALS
Qty: 60 CAPSULE | Refills: 5 | Status: SHIPPED | OUTPATIENT
Start: 2021-08-19 | End: 2022-03-09

## 2021-08-19 RX ORDER — OXCARBAZEPINE 300 MG/1
300 TABLET, FILM COATED ORAL 2 TIMES DAILY
Qty: 180 TABLET | Refills: 1 | Status: SHIPPED | OUTPATIENT
Start: 2021-08-19 | End: 2022-03-24

## 2021-08-19 NOTE — PROGRESS NOTES
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  Denies SI/HI  Klonopin is helping his anxiety a lot, only taking as needed               The following portions of the patient's history were reviewed and updated as appropriate: allergies, current medications, past family history, past medical history, past social history, past surgical history and problem list.    PAST PSYCHIATRIC HISTORY  Axis I  Affective/Bipoloar Disorder, Anxiety Disorder  Axis II  None    PAST OUTPATIENT TREATMENT  Diagnosis treated:  Affective Disorder, Anxiety Disorder  Treatment Type:  Individual Therapy, Medication Management  Genesight testing done March 2019  Prior Psychiatric Medications:  Prozac, depression worse  Paxil  Lexapro  Buspar   Abilfiy  Ambien, felt weird  Vraylar, made his chest tight   Trileptal  Klonopin  Geodon  Support Groups:  None  Sequelae Of Mental Disorder:  job disruption, family disruption, emotional distress, financial hardships      Interval History  Improved    Side Effects  Buspar triggered yvonne but helped his sexual side effects from Lexapro    Past psychiatric history reviewed and compared to 5/20/20 visit and appropriate updates were made.    Past Medical History:  Past Medical History:   Diagnosis Date   • Disease of thyroid gland        Social History:  Social History     Socioeconomic History   • Marital status:      Spouse name: Not on file   • Number of children: Not  on file   • Years of education: Not on file   • Highest education level: Not on file   Tobacco Use   • Smoking status: Former Smoker   • Smokeless tobacco: Never Used   Substance and Sexual Activity   • Alcohol use: No   • Drug use: No   • Sexual activity: Yes       Family History:  Family History   Problem Relation Age of Onset   • Bipolar disorder Mother    • Bipolar disorder Sister        Past Surgical History:  No past surgical history on file.    Problem List:  Patient Active Problem List   Diagnosis   • Generalized anxiety disorder   • Genital herpes   • Hypercholesterolemia   • Hypothyroidism, adult   • Bipolar affective disorder, mixed (CMS/HCC)       Allergy:   No Known Allergies     Discontinued Medications:  Medications Discontinued During This Encounter   Medication Reason   • Vraylar 1.5 MG capsule capsule Alternate therapy   • ziprasidone (GEODON) 20 MG capsule Dose adjustment   • clonazePAM (KlonoPIN) 0.5 MG tablet Reorder   • OXcarbazepine (TRILEPTAL) 300 MG tablet Reorder       Current Medications:   Current Outpatient Medications   Medication Sig Dispense Refill   • bismuth subsalicylate (PEPTO BISMOL) 262 MG/15ML suspension Take 15 mL by mouth.     • clonazePAM (KlonoPIN) 0.5 MG tablet Take 1 tablet by mouth 2 (Two) Times a Day As Needed for Anxiety. 60 tablet 0   • hydrOXYzine pamoate (VISTARIL) 25 MG capsule TAKE 1 CAPSULE BY MOUTH 3 (THREE) TIMES A DAY AS NEEDED FOR ANXIETY. 90 capsule 2   • levothyroxine (SYNTHROID, LEVOTHROID) 75 MCG tablet      • OXcarbazepine (TRILEPTAL) 300 MG tablet Take 1 tablet by mouth 2 (Two) Times a Day. 180 tablet 1   • valACYclovir (VALTREX) 500 MG tablet Take 500 mg by mouth Daily.     • ziprasidone (Geodon) 40 MG capsule Take 1 capsule by mouth 2 (Two) Times a Day With Meals. 60 capsule 5     No current facility-administered medications for this visit.         Review of Symptoms:    Psychiatric/Behavioral: Negative for agitation, behavioral problems, confusion,  decreased concentration, dysphoric mood, hallucinations, self-injury, sleep disturbance and suicidal ideas. The patient is not nervous/anxious and is not hyperactive.        Physical Exam:   There were no vitals taken for this visit.    Mental Status Exam:   Hygiene: Good  Cooperation:  Cooperative  Eye Contact: Good  Psychomotor Behavior:  Appropriate  Affect:  Full range  Mood: normal  Hopelessness: Denies  Speech:  Normal  Thought Process:  Goal directed  Thought Content:  Normal  Suicidal:  None  Homicidal:  None  Hallucinations:  None  Delusion:  None  Memory:  Intact  Orientation:  Person, Place, Time and Situation  Reliability:  good  Insight:  Good  Judgement:  Good  Impulse Control:  Good  Physical/Medical Issues:  Yes Hypothyroidism     Mental status exam was reviewed and compared to 5/20/20 visit and appropriate updates were made.    PHQ-9 Depression Screening  Little interest or pleasure in doing things? 1   Feeling down, depressed, or hopeless? 1   Trouble falling or staying asleep, or sleeping too much? 0   Feeling tired or having little energy? 1   Poor appetite or overeating? 0   Feeling bad about yourself - or that you are a failure or have let yourself or your family down? 1   Trouble concentrating on things, such as reading the newspaper or watching television? 1   Moving or speaking so slowly that other people could have noticed? Or the opposite - being so fidgety or restless that you have been moving around a lot more than usual? 0   Thoughts that you would be better off dead, or of hurting yourself in some way? 0   PHQ-9 Total Score 5   If you checked off any problems, how difficult have these problems made it for you to do your work, take care of things at home, or get along with other people? Somewhat difficult           Former smoker    I advised Basil of the risks of tobacco use.     Lab Results:   No visits with results within 3 Month(s) from this visit.   Latest known visit with results  is:   No results found for any previous visit.       Assessment/Plan   Problems Addressed this Visit        Mental Health    Bipolar affective disorder, mixed (CMS/HCC) - Primary (Chronic)    Relevant Medications    OXcarbazepine (TRILEPTAL) 300 MG tablet    ziprasidone (Geodon) 40 MG capsule    Generalized anxiety disorder    Relevant Medications    clonazePAM (KlonoPIN) 0.5 MG tablet    ziprasidone (Geodon) 40 MG capsule      Diagnoses       Codes Comments    Bipolar affective disorder, mixed (CMS/HCC)    -  Primary ICD-10-CM: F31.60  ICD-9-CM: 296.60     Generalized anxiety disorder     ICD-10-CM: F41.1  ICD-9-CM: 300.02           Visit Diagnoses:    ICD-10-CM ICD-9-CM   1. Bipolar affective disorder, mixed (CMS/HCC)  F31.60 296.60   2. Generalized anxiety disorder  F41.1 300.02       TREATMENT PLAN/GOALS: Continue supportive psychotherapy efforts and medications as indicated. Treatment and medication options discussed during today's visit. Patient ackowledged and verbally consented to continue with current treatment plan and was educated on the importance of compliance with treatment and follow-up appointments.    MEDICATION ISSUES:  INSPECT reviewed as expected  Discussed medication options and treatment plan of prescribed medication as well as the risks, benefits, and side effects including potential falls, possible impaired driving and metabolic adversities among others. Patient is agreeable to call the office with any worsening of symptoms or onset of side effects. Patient is agreeable to call 911 or go to the nearest ER should he/she begin having SI/HI. No medication side effects or related complaints today.     Patient is doing better on Geodon than the Vraylar but still has moments when he wants to spend  Continue Trileptal 300 twice daily for mood stabilizer  Refill Klonopin 0.5mg Bid prns anxiety  Increase Geodon to 40mg Bid  Patient is a reduced converter of folic acid, will consider supplementation  with l-methylfolate down the road.    MEDS ORDERED DURING VISIT:  New Medications Ordered This Visit   Medications   • clonazePAM (KlonoPIN) 0.5 MG tablet     Sig: Take 1 tablet by mouth 2 (Two) Times a Day As Needed for Anxiety.     Dispense:  60 tablet     Refill:  0   • OXcarbazepine (TRILEPTAL) 300 MG tablet     Sig: Take 1 tablet by mouth 2 (Two) Times a Day.     Dispense:  180 tablet     Refill:  1   • ziprasidone (Geodon) 40 MG capsule     Sig: Take 1 capsule by mouth 2 (Two) Times a Day With Meals.     Dispense:  60 capsule     Refill:  5       Return in about 4 months (around 12/19/2021).    Answers for HPI/ROS submitted by the patient on 8/17/2021  What is the primary reason for your visit?: Other  Please describe your symptoms.: Heart attack symptoms, stroke symptoms. Mainly when I'm eating dinner with family or driving. Sometimes when I have sex. Mornings are the worst. I quit my job about 2 or 3 months ago because it was all day there. My heart feels like it has a broken rhythm and wants to beat out of my chest and half of my face gets tingly like it fell asleep. I've been to the doctors a few times but I'm surprisingly in great health. This happens like 4 or 5 days a week, somedays are bad and it's many times a day. I have a  on it, like I know what's happening and it's a pretty silent thing that no one else knows I  am going through.  Have you had these symptoms before?: Yes  How long have you been having these symptoms?: Greater than 2 weeks  Please describe any probable cause for these symptoms. : It started when I passed out at work. I have always had anxiety but I was pretty well used to it at that point in my life, but after I passed out I was afraid I would pass out again, and any time I would be in a situation where I would not want to pass out, I would feel like I was going to pass out. It was a mess. It's gotten better after I saw a psychologist and had a prescription for it. However it's  still something I live with on a daily basis.      This document has been electronically signed by Kristy Reese PA-C  August 19, 2021 13:21 EDT

## 2021-10-07 RX ORDER — ZIPRASIDONE HYDROCHLORIDE 40 MG/1
40 CAPSULE ORAL 2 TIMES DAILY WITH MEALS
Qty: 60 CAPSULE | Refills: 5 | OUTPATIENT
Start: 2021-10-07

## 2022-01-03 ENCOUNTER — OFFICE VISIT (OUTPATIENT)
Dept: PSYCHIATRY | Facility: CLINIC | Age: 37
End: 2022-01-03

## 2022-01-03 DIAGNOSIS — F41.1 GENERALIZED ANXIETY DISORDER: Chronic | ICD-10-CM

## 2022-01-03 DIAGNOSIS — F31.60 BIPOLAR AFFECTIVE DISORDER, MIXED: Primary | Chronic | ICD-10-CM

## 2022-01-03 PROCEDURE — 99214 OFFICE O/P EST MOD 30 MIN: CPT | Performed by: PHYSICIAN ASSISTANT

## 2022-01-03 RX ORDER — LEVOMEFOLATE CALCIUM 15 MG
15 TABLET ORAL DAILY
Qty: 90 TABLET | Refills: 3 | Status: SHIPPED | OUTPATIENT
Start: 2022-01-03 | End: 2022-03-18

## 2022-01-03 RX ORDER — CLONAZEPAM 1 MG/1
1 TABLET ORAL 2 TIMES DAILY PRN
Qty: 60 TABLET | Refills: 2 | Status: SHIPPED | OUTPATIENT
Start: 2022-01-03 | End: 2022-11-15 | Stop reason: SDUPTHER

## 2022-01-03 RX ORDER — VILAZODONE HYDROCHLORIDE 10 MG/1
10 TABLET ORAL DAILY
Qty: 30 TABLET | Refills: 5 | Status: SHIPPED | OUTPATIENT
Start: 2022-01-03 | End: 2022-03-18

## 2022-01-03 NOTE — PROGRESS NOTES
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    PAST PSYCHIATRIC HISTORY  Axis I  Affective/Bipoloar Disorder, Anxiety Disorder  Axis II  None    PAST OUTPATIENT TREATMENT  Diagnosis treated:  Affective Disorder, Anxiety Disorder  Treatment Type:  Individual Therapy, Medication Management  Genesight testing done March 2019  Prior Psychiatric Medications:  Prozac, depression worse  Paxil  Lexapro  Buspar   Abilfiy  Ambien, felt weird  Vraylar, made his chest tight   Trileptal  Klonopin  Geodon  Support Groups:  None  Sequelae Of Mental Disorder:  job disruption, family disruption, emotional distress, financial hardships      Interval History  Improved    Side Effects  Buspar triggered yvonne but helped his sexual side effects from Lexapro    Past psychiatric history reviewed and compared to 8/19/21 visit and appropriate updates were made.    Past Medical History:  Past Medical History:   Diagnosis Date   • Disease of thyroid gland        Social History:  Social History     Socioeconomic History   • Marital status:    Tobacco Use   • Smoking status: Former Smoker   • Smokeless tobacco: Never Used   Substance and Sexual Activity   • Alcohol use: No   • Drug use: " No   • Sexual activity: Yes       Family History:  Family History   Problem Relation Age of Onset   • Bipolar disorder Mother    • Bipolar disorder Sister        Past Surgical History:  History reviewed. No pertinent surgical history.    Problem List:  Patient Active Problem List   Diagnosis   • Generalized anxiety disorder   • Genital herpes   • Hypercholesterolemia   • Hypothyroidism, adult   • Bipolar affective disorder, mixed (HCC)       Allergy:   No Known Allergies     Discontinued Medications:  Medications Discontinued During This Encounter   Medication Reason   • clonazePAM (KlonoPIN) 0.5 MG tablet        Current Medications:   Current Outpatient Medications   Medication Sig Dispense Refill   • bismuth subsalicylate (PEPTO BISMOL) 262 MG/15ML suspension Take 15 mL by mouth.     • clonazePAM (KlonoPIN) 1 MG tablet Take 1 tablet by mouth 2 (Two) Times a Day As Needed for Anxiety. 60 tablet 2   • hydrOXYzine pamoate (VISTARIL) 25 MG capsule TAKE 1 CAPSULE BY MOUTH 3 (THREE) TIMES A DAY AS NEEDED FOR ANXIETY. 90 capsule 2   • l-methylfolate 15 MG tablet tablet Take 1 tablet by mouth Daily. 90 tablet 3   • levothyroxine (SYNTHROID, LEVOTHROID) 75 MCG tablet      • OXcarbazepine (TRILEPTAL) 300 MG tablet Take 1 tablet by mouth 2 (Two) Times a Day. 180 tablet 1   • valACYclovir (VALTREX) 500 MG tablet Take 500 mg by mouth Daily.     • vilazodone (Viibryd) 10 MG tablet tablet Take 1 tablet by mouth Daily. 30 tablet 5   • ziprasidone (Geodon) 40 MG capsule Take 1 capsule by mouth 2 (Two) Times a Day With Meals. 60 capsule 5     No current facility-administered medications for this visit.         Review of Symptoms:    Psychiatric/Behavioral: Negative for agitation, behavioral problems, confusion, decreased concentration, dysphoric mood, hallucinations, self-injury, sleep disturbance and suicidal ideas. The patient is not nervous/anxious and is not hyperactive.        Physical Exam:   There were no vitals taken for  this visit.    Mental Status Exam:   Hygiene: Good  Cooperation:  Cooperative  Eye Contact: Good  Psychomotor Behavior:  Appropriate  Affect:  Full range  Mood: normal  Hopelessness: Denies  Speech:  Normal  Thought Process:  Goal directed  Thought Content:  Normal  Suicidal:  None  Homicidal:  None  Hallucinations:  None  Delusion:  None  Memory:  Intact  Orientation:  Person, Place, Time and Situation  Reliability:  good  Insight:  Good  Judgement:  Good  Impulse Control:  Good  Physical/Medical Issues:  Yes Hypothyroidism     Mental status exam was reviewed and compared to 8/19/21 visit and appropriate updates were made.    PHQ-9 Depression Screening  Little interest or pleasure in doing things? 1   Feeling down, depressed, or hopeless? 1   Trouble falling or staying asleep, or sleeping too much? 0   Feeling tired or having little energy? 1   Poor appetite or overeating? 0   Feeling bad about yourself - or that you are a failure or have let yourself or your family down? 0   Trouble concentrating on things, such as reading the newspaper or watching television? 1   Moving or speaking so slowly that other people could have noticed? Or the opposite - being so fidgety or restless that you have been moving around a lot more than usual? 0   Thoughts that you would be better off dead, or of hurting yourself in some way? 0   PHQ-9 Total Score 4   If you checked off any problems, how difficult have these problems made it for you to do your work, take care of things at home, or get along with other people? Somewhat difficult           Former smoker    I advised Basil of the risks of tobacco use.     Lab Results:   No visits with results within 3 Month(s) from this visit.   Latest known visit with results is:   No results found for any previous visit.       Assessment/Plan   Problems Addressed this Visit        Mental Health    Generalized anxiety disorder (Chronic)    Relevant Medications    vilazodone (Viibryd) 10 MG tablet  tablet    clonazePAM (KlonoPIN) 1 MG tablet    Bipolar affective disorder, mixed (HCC) - Primary (Chronic)    Relevant Medications    vilazodone (Viibryd) 10 MG tablet tablet      Diagnoses       Codes Comments    Bipolar affective disorder, mixed (HCC)    -  Primary ICD-10-CM: F31.60  ICD-9-CM: 296.60     Generalized anxiety disorder     ICD-10-CM: F41.1  ICD-9-CM: 300.02           Visit Diagnoses:    ICD-10-CM ICD-9-CM   1. Bipolar affective disorder, mixed (HCC)  F31.60 296.60   2. Generalized anxiety disorder  F41.1 300.02       TREATMENT PLAN/GOALS: Continue supportive psychotherapy efforts and medications as indicated. Treatment and medication options discussed during today's visit. Patient ackowledged and verbally consented to continue with current treatment plan and was educated on the importance of compliance with treatment and follow-up appointments.    MEDICATION ISSUES:  INSPECT reviewed as expected  Discussed medication options and treatment plan of prescribed medication as well as the risks, benefits, and side effects including potential falls, possible impaired driving and metabolic adversities among others. Patient is agreeable to call the office with any worsening of symptoms or onset of side effects. Patient is agreeable to call 911 or go to the nearest ER should he/she begin having SI/HI. No medication side effects or related complaints today.     Patient is doing better on Geodon than the Vraylar but still has moments when he wants to spend  Continue Trileptal 300 twice daily for mood stabilizer  Change Klonopin to 1 mg BID prn anxiety  Continue Geodon 40mg Bid  Patient is a reduced converter of folic acid, add L-methylfolate down the road.  Add Viibryd 10mg daily for the anxiety and mood, can increase to 20mg in a month if needed.     MEDS ORDERED DURING VISIT:  New Medications Ordered This Visit   Medications   • l-methylfolate 15 MG tablet tablet     Sig: Take 1 tablet by mouth Daily.      Dispense:  90 tablet     Refill:  3   • vilazodone (Viibryd) 10 MG tablet tablet     Sig: Take 1 tablet by mouth Daily.     Dispense:  30 tablet     Refill:  5   • clonazePAM (KlonoPIN) 1 MG tablet     Sig: Take 1 tablet by mouth 2 (Two) Times a Day As Needed for Anxiety.     Dispense:  60 tablet     Refill:  2       Return in about 3 months (around 4/3/2022).      This document has been electronically signed by Kristy Reese PA-C  January 4, 2022 09:07 EST

## 2022-03-09 RX ORDER — ZIPRASIDONE HYDROCHLORIDE 40 MG/1
40 CAPSULE ORAL 2 TIMES DAILY WITH MEALS
Qty: 180 CAPSULE | Refills: 1 | Status: SHIPPED | OUTPATIENT
Start: 2022-03-09 | End: 2022-09-18 | Stop reason: SDUPTHER

## 2022-03-17 ENCOUNTER — APPOINTMENT (OUTPATIENT)
Dept: CT IMAGING | Facility: HOSPITAL | Age: 37
End: 2022-03-17

## 2022-03-17 ENCOUNTER — APPOINTMENT (OUTPATIENT)
Dept: GENERAL RADIOLOGY | Facility: HOSPITAL | Age: 37
End: 2022-03-17

## 2022-03-17 ENCOUNTER — HOSPITAL ENCOUNTER (EMERGENCY)
Facility: HOSPITAL | Age: 37
Discharge: HOME | End: 2022-03-17
Admitting: EMERGENCY MEDICINE

## 2022-03-17 VITALS
DIASTOLIC BLOOD PRESSURE: 60 MMHG | RESPIRATION RATE: 20 BRPM | HEART RATE: 54 BPM | OXYGEN SATURATION: 99 % | SYSTOLIC BLOOD PRESSURE: 129 MMHG | HEIGHT: 67 IN | TEMPERATURE: 98.1 F | BODY MASS INDEX: 23.54 KG/M2 | WEIGHT: 150 LBS

## 2022-03-17 DIAGNOSIS — R55 SYNCOPE AND COLLAPSE: Primary | ICD-10-CM

## 2022-03-17 PROBLEM — F31.81 BIPOLAR 2 DISORDER: Status: ACTIVE | Noted: 2022-03-17

## 2022-03-17 LAB
AMPHET+METHAMPHET UR QL: NEGATIVE
ANION GAP SERPL CALCULATED.3IONS-SCNC: 9 MMOL/L (ref 5–15)
BARBITURATES UR QL SCN: NEGATIVE
BASOPHILS # BLD AUTO: 0 10*3/MM3 (ref 0–0.2)
BASOPHILS NFR BLD AUTO: 0.8 % (ref 0–1.5)
BENZODIAZ UR QL SCN: NEGATIVE
BILIRUB UR QL STRIP: NEGATIVE
BUN SERPL-MCNC: 15 MG/DL (ref 6–20)
BUN/CREAT SERPL: 14.2 (ref 7–25)
CALCIUM SPEC-SCNC: 8.4 MG/DL (ref 8.6–10.5)
CANNABINOIDS SERPL QL: POSITIVE
CHLORIDE SERPL-SCNC: 103 MMOL/L (ref 98–107)
CLARITY UR: CLEAR
CO2 SERPL-SCNC: 25 MMOL/L (ref 22–29)
COCAINE UR QL: NEGATIVE
COLOR UR: YELLOW
CREAT SERPL-MCNC: 1.06 MG/DL (ref 0.76–1.27)
DEPRECATED RDW RBC AUTO: 40.3 FL (ref 37–54)
EGFRCR SERPLBLD CKD-EPI 2021: 93.3 ML/MIN/1.73
EOSINOPHIL # BLD AUTO: 0.1 10*3/MM3 (ref 0–0.4)
EOSINOPHIL NFR BLD AUTO: 1.4 % (ref 0.3–6.2)
ERYTHROCYTE [DISTWIDTH] IN BLOOD BY AUTOMATED COUNT: 12.4 % (ref 12.3–15.4)
GLUCOSE SERPL-MCNC: 90 MG/DL (ref 65–99)
GLUCOSE UR STRIP-MCNC: NEGATIVE MG/DL
HCT VFR BLD AUTO: 41.8 % (ref 37.5–51)
HGB BLD-MCNC: 14.5 G/DL (ref 13–17.7)
HGB UR QL STRIP.AUTO: NEGATIVE
KETONES UR QL STRIP: NEGATIVE
LEUKOCYTE ESTERASE UR QL STRIP.AUTO: NEGATIVE
LYMPHOCYTES # BLD AUTO: 0.9 10*3/MM3 (ref 0.7–3.1)
LYMPHOCYTES NFR BLD AUTO: 19.6 % (ref 19.6–45.3)
MCH RBC QN AUTO: 32.1 PG (ref 26.6–33)
MCHC RBC AUTO-ENTMCNC: 34.6 G/DL (ref 31.5–35.7)
MCV RBC AUTO: 92.8 FL (ref 79–97)
METHADONE UR QL SCN: NEGATIVE
MONOCYTES # BLD AUTO: 0.3 10*3/MM3 (ref 0.1–0.9)
MONOCYTES NFR BLD AUTO: 6.5 % (ref 5–12)
NEUTROPHILS NFR BLD AUTO: 3.5 10*3/MM3 (ref 1.7–7)
NEUTROPHILS NFR BLD AUTO: 71.7 % (ref 42.7–76)
NITRITE UR QL STRIP: NEGATIVE
NRBC BLD AUTO-RTO: 0.1 /100 WBC (ref 0–0.2)
OPIATES UR QL: NEGATIVE
OXYCODONE UR QL SCN: NEGATIVE
PH UR STRIP.AUTO: 7.5 [PH] (ref 5–8)
PLATELET # BLD AUTO: 154 10*3/MM3 (ref 140–450)
PMV BLD AUTO: 8.7 FL (ref 6–12)
POTASSIUM SERPL-SCNC: 3.9 MMOL/L (ref 3.5–5.2)
PROT UR QL STRIP: NEGATIVE
RBC # BLD AUTO: 4.51 10*6/MM3 (ref 4.14–5.8)
SODIUM SERPL-SCNC: 137 MMOL/L (ref 136–145)
SP GR UR STRIP: 1.02 (ref 1–1.03)
TSH SERPL DL<=0.05 MIU/L-ACNC: 3.05 UIU/ML (ref 0.27–4.2)
UROBILINOGEN UR QL STRIP: NORMAL
WBC NRBC COR # BLD: 4.8 10*3/MM3 (ref 3.4–10.8)

## 2022-03-17 PROCEDURE — 80307 DRUG TEST PRSMV CHEM ANLYZR: CPT

## 2022-03-17 PROCEDURE — 93005 ELECTROCARDIOGRAM TRACING: CPT

## 2022-03-17 PROCEDURE — 84443 ASSAY THYROID STIM HORMONE: CPT

## 2022-03-17 PROCEDURE — 80048 BASIC METABOLIC PNL TOTAL CA: CPT

## 2022-03-17 PROCEDURE — 70450 CT HEAD/BRAIN W/O DYE: CPT

## 2022-03-17 PROCEDURE — 99283 EMERGENCY DEPT VISIT LOW MDM: CPT

## 2022-03-17 PROCEDURE — 71045 X-RAY EXAM CHEST 1 VIEW: CPT

## 2022-03-17 PROCEDURE — 81003 URINALYSIS AUTO W/O SCOPE: CPT

## 2022-03-17 PROCEDURE — 85025 COMPLETE CBC W/AUTO DIFF WBC: CPT

## 2022-03-17 PROCEDURE — 72125 CT NECK SPINE W/O DYE: CPT

## 2022-03-17 RX ORDER — SODIUM CHLORIDE 0.9 % (FLUSH) 0.9 %
10 SYRINGE (ML) INJECTION AS NEEDED
Status: DISCONTINUED | OUTPATIENT
Start: 2022-03-17 | End: 2022-03-17 | Stop reason: HOSPADM

## 2022-03-17 NOTE — DISCHARGE INSTRUCTIONS
Increase fluid intake and rest  Keep appointment with primary care that is scheduled for tomorrow  Phone number for Dr. Gonzales has been added to your discharge instruction, he is the cardiologist you have seen in the past please follow-up with him.    Return to the ER for new or worsening symptoms such as chest pain, shortness of breath, palpitations or repeated syncopal episodes, etc.

## 2022-03-17 NOTE — ED PROVIDER NOTES
Subjective   Chief Complaint: Syncope      HPI: Patient is a 36-year-old  male who presents to the ER today following a syncopal episode.  States that he awoke this morning and went to the bathroom without symptoms mid stream of urination had a syncopal episode with unknown downtime.  This episode was unwitnessed.  States that he had a similar episode approximately 1 year ago while he was stretching.  Patient complained of some lightheadedness and tailbone pain both of which have improved or resolved upon his arrival to the ER.  He has no headache, visual changes, dizziness.  He has had no nausea or vomiting.  No chest pain, palpitations, or shortness of breath.  No recent illness or exposure.  States that he was not straining during his urination.  He does have a history of hypothyroidism that he is compliant with his levothyroxine states that his TSH was checked approximately 7 to 8 months ago.  He also has a history of bipolar.  He does not drink alcohol but does use marijuana, his last use was last night.  He does not have a history of seizures, he has never had a CVA or TIA.  He denies confusion or weakness.    PCP: Goyo          Review of Systems   Constitutional: Negative for fatigue and fever.   Respiratory: Negative.    Cardiovascular: Negative.    Gastrointestinal: Negative for abdominal pain, constipation, nausea and vomiting.   Genitourinary: Negative.  Negative for difficulty urinating and dysuria.   Musculoskeletal: Positive for arthralgias. Negative for neck pain.        Coccyx discomfort   Skin: Negative.    Neurological: Positive for syncope and light-headedness. Negative for dizziness, facial asymmetry and weakness.   Psychiatric/Behavioral: Negative.        Past Medical History:   Diagnosis Date   • Disease of thyroid gland        No Known Allergies    No past surgical history on file.    Family History   Problem Relation Age of Onset   • Bipolar disorder Mother    • Bipolar disorder  Sister        Social History     Socioeconomic History   • Marital status:    Tobacco Use   • Smoking status: Former Smoker   • Smokeless tobacco: Never Used   Substance and Sexual Activity   • Alcohol use: No   • Drug use: No   • Sexual activity: Yes           Objective   Physical Exam  Vitals reviewed.   Constitutional:       General: He is not in acute distress.     Appearance: Normal appearance. He is not ill-appearing.   HENT:      Head: Normocephalic.      Right Ear: Tympanic membrane normal.      Left Ear: Tympanic membrane normal.      Nose: Nose normal. No congestion.      Mouth/Throat:      Mouth: Mucous membranes are moist.      Pharynx: Oropharynx is clear.   Eyes:      Extraocular Movements:      Right eye: Normal extraocular motion and no nystagmus.      Left eye: No nystagmus.      Pupils: Pupils are equal, round, and reactive to light.   Neck:      Comments: No step offs, cary signs or racoon eyes   Cardiovascular:      Rate and Rhythm: Regular rhythm. Bradycardia present.      Pulses: Normal pulses.      Heart sounds: Normal heart sounds. No murmur heard.  Pulmonary:      Effort: Pulmonary effort is normal.      Breath sounds: Normal breath sounds. No wheezing.   Abdominal:      General: Bowel sounds are normal.      Palpations: Abdomen is soft.      Tenderness: There is no abdominal tenderness.   Musculoskeletal:         General: Normal range of motion.      Cervical back: Neck supple. No tenderness or crepitus. No pain with movement.   Skin:     General: Skin is warm and dry.      Findings: No bruising.   Neurological:      General: No focal deficit present.      Mental Status: He is alert and oriented to person, place, and time.      Motor: No weakness.   Psychiatric:         Mood and Affect: Mood normal.         Behavior: Behavior normal.         Thought Content: Thought content normal.         Judgment: Judgment normal.         Procedures      EKG obtained reviewed by myself read by  "Dr. Howe.  Sinus bradycardia with a rate of 57.  No ST elevation or ectopy noted.  There is no previous for comparison.         ED Course  ED Course as of 03/17/22 1146   Thu Mar 17, 2022   0953 Orthostatic vital signs completed  Lying 118/67 HR 55  Sitting 124/52 HR 53  Standing 119/51 HR 59 []   1034 Pending collection of urine, staff notified  []      ED Course User Index  [] Martín Odalis OLIVIA TOMAS      /51 (Patient Position: Standing)   Pulse 59   Temp 98 °F (36.7 °C) (Temporal)   Resp 20   Ht 170.2 cm (67\")   Wt 68 kg (150 lb)   SpO2 99%   BMI 23.49 kg/m²   Labs Reviewed   URINE DRUG SCREEN - Abnormal; Notable for the following components:       Result Value    THC, Screen, Urine Positive (*)     All other components within normal limits    Narrative:     Negative Thresholds Per Drugs Screened:    Amphetamines                 500 ng/ml  Barbiturates                 200 ng/ml  Benzodiazepines              100 ng/ml  Cocaine                      300 ng/ml  Methadone                    300 ng/ml  Opiates                      300 ng/ml  Oxycodone                    100 ng/ml  THC                           50 ng/ml    The Normal Value for all drugs tested is negative. This report includes final unconfirmed screening results to be used for medical treatment purposes only. Unconfirmed results must not be used for non-medical purposes such as employment or legal testing. Clinical consideration should be applied to any drug of abuse test, particularly when unconfirmed results are used.          All urine drugs of abuse requests without chain of custody are for medical screening purposes only.  False positives are possible.     BASIC METABOLIC PANEL - Abnormal; Notable for the following components:    Calcium 8.4 (*)     All other components within normal limits    Narrative:     GFR Normal >60  Chronic Kidney Disease <60  Kidney Failure <15     TSH - Normal   URINALYSIS W/ MICROSCOPIC IF INDICATED " (NO CULTURE) - Normal    Narrative:     Urine microscopic not indicated.   CBC WITH AUTO DIFFERENTIAL - Normal   CBC AND DIFFERENTIAL    Narrative:     The following orders were created for panel order CBC & Differential.  Procedure                               Abnormality         Status                     ---------                               -----------         ------                     CBC Auto Differential[[id number]]        Normal              Final result                 Please view results for these tests on the individual orders.     Medications   sodium chloride 0.9 % flush 10 mL (has no administration in time range)     CT Head Without Contrast    Result Date: 3/17/2022   No evidence of acute intracranial process.    Electronically Signed By-Warren Caban On:3/17/2022 10:08 AM This report was finalized on [id number] by  Warren Caban, .    CT Cervical Spine Without Contrast    Result Date: 3/17/2022  No evidence of fracture or other acute abnormality of the cervical spine.  Electronically Signed By-Warren Caban On:3/17/2022 10:10 AM This report was finalized on [id number] by  Warren Caban, .    XR Chest 1 View    Result Date: 3/17/2022  1.Blunting of the lateral costophrenic sulci which may be more chronic although small pleural reactions not excluded.  Electronically Signed By-Rob Galindo MD On:3/17/2022 11:02 AM This report was finalized on [id number] by  Rob Galindo MD.                                               MDM  Number of Diagnoses or Management Options  Syncope and collapse  Diagnosis management comments: While in the emergency room patient was placed on a cardiac monitor IV was established. Orthostatic vital signs were benign.  CT of head and neck were negative for acute findings.  He has been without syncope or related symptoms while in the ER.  TSH was 3.050 which is consistent with his most recent that was completed in April 2021.  CMP and CBC were essentially normal.  Urine  drug screen was positive for THC which is consistent with patient's history, urinalysis was negative for urinary tract infection.  Chest x-ray was negative for acute cardiac findings.  Symptoms were discussed with patient and female family member who is at bedside.  He was advised to rest and increase fluid intake today.  He reports he has an appointment with his primary care tomorrow and was advised to discuss today's symptoms and testing.  He was advised to follow-up with cardiology, according to [facility] charting he has seen Dr. Gonzales in the last 2 years.  Patient's vital signs have been stable while in the emergency room with a blood pressure consistently in the 120s over 80s.  He denies any pain at this time.  Patient gave verbal understanding of the plan of care and was agreeable to discharge, he was educated on signs and symptoms to monitor for and when to return to the emergency room.  Patient was alert and oriented with stable vital signs, independently ambulatory, there were no further questions at time of discharge.      Chart review:  7/30/2020 Dr. Gonzales cardiology, syncope and bradycardia following increased caffeine intake and increase of psych medication.  Echo and holter were ordered.  Results cannot be found in chart.   TSH 4/30/2021 TSH 3.010     Comorbidities: Hypothyroid, bipolar    Differentials: Syncope, TIA, electrolyte imbalance, thyroid dysfunction   not all inclusive of differentials considered    Pt is aware that discharge does not mean that nothing is wrong but it indicates no emergency is present and they must continue care with follow-up as given below or physician of their choice    Appropriate PPE worn throughout the care of this patient.           Amount and/or Complexity of Data Reviewed  Clinical lab tests: reviewed  Tests in the radiology section of CPT®: reviewed  Tests in the medicine section of CPT®: reviewed    Patient Progress  Patient progress: stable      Final diagnoses:    Syncope and collapse       ED Disposition  ED Disposition     ED Disposition   Discharge    Condition   Stable    Comment   --             Julio Nance MD  [address] [id number]  [phone]    Go in 1 day      Khoa Gonzales MD  [address] [place] [address]  [phone]    Schedule an appointment as soon as possible for a visit in 1 week  As needed         Medication List      No changes were made to your prescriptions during this visit.          Odalis Resendiz, APRN  03/17/22 1613

## 2022-03-18 ENCOUNTER — OFFICE VISIT (OUTPATIENT)
Dept: FAMILY MEDICINE CLINIC | Facility: CLINIC | Age: 37
End: 2022-03-18

## 2022-03-18 VITALS
HEIGHT: 67 IN | BODY MASS INDEX: 23.17 KG/M2 | HEART RATE: 78 BPM | SYSTOLIC BLOOD PRESSURE: 117 MMHG | TEMPERATURE: 97.8 F | WEIGHT: 147.6 LBS | DIASTOLIC BLOOD PRESSURE: 71 MMHG | OXYGEN SATURATION: 98 % | RESPIRATION RATE: 16 BRPM

## 2022-03-18 DIAGNOSIS — Z76.89 ENCOUNTER TO ESTABLISH CARE: Primary | ICD-10-CM

## 2022-03-18 DIAGNOSIS — K13.79 MUCOCELE OF BUCCAL MUCOSA: ICD-10-CM

## 2022-03-18 PROCEDURE — 99203 OFFICE O/P NEW LOW 30 MIN: CPT | Performed by: NURSE PRACTITIONER

## 2022-03-18 NOTE — PROGRESS NOTES
"Chief Complaint  Establish Care and Mouth Lesions    Subjective          Basil Alarcon Jr presents to [facility]  History of Present Illness  Is a new patient here to establish care and c/o recurrent sore on the inside of his mouth  Initially - 4-5 weeks ago - he bit it, then it resolved, but had several recurrences, currently seems to be resolving    Denies that the lesion is painful, has been tender when enlarged after manipulating it frequently    Review of Systems   Constitutional: Negative.  Negative for activity change, appetite change, fatigue and fever.   HENT: Positive for mouth sores.    Respiratory: Negative.    Gastrointestinal: Negative.    Genitourinary: Negative.    Neurological: Negative.    Psychiatric/Behavioral: Negative.        Objective   Vital Signs:   /71 (BP Location: Left arm, Patient Position: Sitting, Cuff Size: Adult)   Pulse 78   Temp 97.8 °F (36.6 °C) (Infrared)   Resp 16   Ht 170.2 cm (67\")   Wt 67 kg (147 lb 9.6 oz)   SpO2 98%   BMI 23.12 kg/m²     Physical Exam  Vitals reviewed.   Constitutional:       Appearance: Normal appearance.   HENT:      Mouth/Throat:      Mouth: Mucous membranes are moist.     Cardiovascular:      Rate and Rhythm: Normal rate and regular rhythm.      Heart sounds: Normal heart sounds.   Pulmonary:      Effort: Pulmonary effort is normal.      Breath sounds: Normal breath sounds.   Neurological:      Mental Status: He is alert.        Result Review :       3/17/22 BMP - glucose 90, BUN 15, creatinine 1.06, Na 137, K= 3.9, chl 103, CO2 25.0, calcium 8.4, anion gap 9.0, eGfr 93.3      TSH    TSH 4/30/21 3/17/22   TSH 3.010 3.050           UA    Urinalysis 3/17/22   Specific Murray, UA 1.016   Ketones, UA Negative   Blood, UA Negative   Leukocytes, UA Negative   Nitrite, UA Negative                     Assessment and Plan    Diagnoses and all orders for this visit:    1. Encounter to establish care " (Primary)    2. Mucocele of buccal mucosa  -     Ambulatory Referral to Oral Maxillofacial Surgery        Follow Up   Return for Annual physical.  Patient was given instructions and counseling regarding his condition or for health maintenance advice. Please see specific information pulled into the AVS if appropriate.

## 2022-03-24 DIAGNOSIS — F31.60 BIPOLAR AFFECTIVE DISORDER, MIXED: Chronic | ICD-10-CM

## 2022-03-24 LAB — QT INTERVAL: 415 MS

## 2022-03-24 RX ORDER — OXCARBAZEPINE 300 MG/1
TABLET, FILM COATED ORAL
Qty: 180 TABLET | Refills: 0 | Status: SHIPPED | OUTPATIENT
Start: 2022-03-24 | End: 2022-07-14 | Stop reason: SDUPTHER

## 2022-04-06 ENCOUNTER — OFFICE VISIT (OUTPATIENT)
Dept: PSYCHIATRY | Facility: CLINIC | Age: 37
End: 2022-04-06

## 2022-04-06 DIAGNOSIS — F41.1 GENERALIZED ANXIETY DISORDER: Chronic | ICD-10-CM

## 2022-04-06 DIAGNOSIS — F31.60 BIPOLAR AFFECTIVE DISORDER, MIXED: Primary | Chronic | ICD-10-CM

## 2022-04-06 PROCEDURE — 99214 OFFICE O/P EST MOD 30 MIN: CPT | Performed by: PHYSICIAN ASSISTANT

## 2022-04-06 RX ORDER — BUSPIRONE HYDROCHLORIDE 10 MG/1
10 TABLET ORAL 2 TIMES DAILY
Qty: 180 TABLET | Refills: 0 | Status: SHIPPED | OUTPATIENT
Start: 2022-04-06 | End: 2023-03-09

## 2022-04-06 NOTE — PROGRESS NOTES
Subjective   Basil Alarcon Jr is a 36 y.o.white male who presents today for follow up in the office      Chief Complaint:  Bipolar, anxiety    History of Present Illness:   He is doing much better but anxiety still high at times with financial concerns now and baby on the way  Panic attacks were getting better then had syncopal episode and they have been worse again  He is only taking the Trileptal and Geodon, rarely takes the Klonopin b/c doesn't want to get addicted  Geodon controls the bipolar sxs better than the Vraylar, takes a nap in the afternoon.  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               The following portions of the patient's history were reviewed and updated as appropriate: allergies, current medications, past family history, past medical history, past social history, past surgical history and problem list.    PAST PSYCHIATRIC HISTORY  Axis I  Affective/Bipoloar Disorder, Anxiety Disorder  Axis II  None    PAST OUTPATIENT TREATMENT  Diagnosis treated:  Affective Disorder, Anxiety Disorder  Treatment Type:  Individual Therapy, Medication Management  Genesight testing done March 2019  Prior Psychiatric Medications:  Prozac, depression worse  Paxil  Lexapro  Buspar  Hydroxyzine, felt weird   Abilfiy  Ambien, felt weird  Vraylar, made his chest tight   Trileptal  Klonopin  Geodon  Viibrid  Support Groups:  None  Sequelae Of Mental Disorder:  job disruption, family disruption, emotional distress, financial hardships      Interval History  Improved    Side Effects  Lexapro triggered yvonne    Past psychiatric history reviewed and compared to 1/3/22 visit and appropriate updates were made.    Past Medical  History:  Past Medical History:   Diagnosis Date   • Anxiety 2018    FOLLOWED BY PSYCH: SARAH DUNLAP PA-C   • Bipolar 2 disorder (HCC) 2018    FOLLOWED BY PSYCH: SARAH DUNLAP PA-C   • Bradycardia 07/2020    DR COLLADO, CARDIO   • Depression 2018    FOLLOWED BY PSYCH: SARAH DUNLAP PA-C   • Disease of thyroid gland 2013    HYPOTHYROID   • Former tobacco use 2018    0.5 PPD / QUIT   • Genital herpes 2013   • History of medical problems 2011   • Hyperlipidemia 2018   • Hypothyroidism 2013   • Kidney stone 09/01/2013    [facility]   • Mucocele of lip 03/08/2022    Bit inside of his lip 3 wks ago - right side, inside lower mucosa   • Substance abuse (HCC) 09/01/2013    POS THC/MARIJUANA USE   • Syncope and collapse 03/17/2022    [facility];  prev saw Dr. Collado, Cardio   • Urinary tract infection 09/01/2013    [facility]       Social History:  Social History     Socioeconomic History   • Marital status:      Spouse name: CRISTIAN PARIKH   Tobacco Use   • Smoking status: Former Smoker     Packs/day: 0.50     Types: Cigarettes   • Smokeless tobacco: Never Used   Vaping Use   • Vaping Use: Never used   Substance and Sexual Activity   • Alcohol use: No   • Drug use: Yes     Types: Marijuana     Comment: DAILY;  LAST USE 3/16/22 PM   • Sexual activity: Yes     Partners: Female     Comment: DENIES HIGH-RISK SEXUAL BEHAVIORS       Family History:  Family History   Problem Relation Age of Onset   • Depression Mother    • Thyroid disease Mother    • Anxiety disorder Mother    • Bipolar disorder Mother    • Mental illness Mother    • Thyroid disease Sister    • Mental illness Sister    • Anxiety disorder Sister    • Depression Sister    • Bipolar disorder Sister        Past Surgical History:  History reviewed. No pertinent surgical history.    Problem List:  Patient Active Problem List   Diagnosis   • Generalized anxiety disorder   • Genital herpes   • Hypercholesterolemia   • Hypothyroidism, adult   • Bipolar affective disorder,  mixed (HCC)   • Bipolar 2 disorder (HCC)       Allergy:   No Known Allergies     Discontinued Medications:  There are no discontinued medications.    Current Medications:   Current Outpatient Medications   Medication Sig Dispense Refill   • busPIRone (BUSPAR) 10 MG tablet Take 1 tablet by mouth 2 (Two) Times a Day. 180 tablet 0   • clonazePAM (KlonoPIN) 1 MG tablet Take 1 tablet by mouth 2 (Two) Times a Day As Needed for Anxiety. 60 tablet 2   • levothyroxine (SYNTHROID, LEVOTHROID) 75 MCG tablet Take 1 tablet by mouth Every Morning Before Breakfast.     • OXcarbazepine (TRILEPTAL) 300 MG tablet TAKE 1 TABLET BY MOUTH TWICE A  tablet 0   • valACYclovir (VALTREX) 500 MG tablet Take 500 mg by mouth 3 (Three) Times a Day As Needed. prn     • ziprasidone (GEODON) 40 MG capsule TAKE 1 CAPSULE BY MOUTH 2 (TWO) TIMES A DAY WITH MEALS. 180 capsule 1     No current facility-administered medications for this visit.         Review of Symptoms:    Psychiatric/Behavioral: Negative for agitation, behavioral problems, confusion, decreased concentration, dysphoric mood, hallucinations, self-injury, sleep disturbance and suicidal ideas. The patient is not nervous/anxious and is not hyperactive.        Physical Exam:   There were no vitals taken for this visit.    Mental Status Exam:   Hygiene: Good  Cooperation:  Cooperative  Eye Contact: Good  Psychomotor Behavior:  Appropriate  Affect:  Full range  Mood: normal  Hopelessness: Denies  Speech:  Normal  Thought Process:  Goal directed  Thought Content:  Normal  Suicidal:  None  Homicidal:  None  Hallucinations:  None  Delusion:  None  Memory:  Intact  Orientation:  Person, Place, Time and Situation  Reliability:  good  Insight:  Good  Judgement:  Good  Impulse Control:  Good  Physical/Medical Issues:  Yes Hypothyroidism     Mental status exam was reviewed and compared to 1/3/22 visit and appropriate updates were made.    PHQ-9 Depression Screening  Little interest or pleasure  in doing things?  0   Feeling down, depressed, or hopeless?  1   Trouble falling or staying asleep, or sleeping too much?     Feeling tired or having little energy?     Poor appetite or overeating?     Feeling bad about yourself - or that you are a failure or have let yourself or your family down?     Trouble concentrating on things, such as reading the newspaper or watching television?     Moving or speaking so slowly that other people could have noticed? Or the opposite - being so fidgety or restless that you have been moving around a lot more than usual?     Thoughts that you would be better off dead, or of hurting yourself in some way?     PHQ-9 Total Score  1   If you checked off any problems, how difficult have these problems made it for you to do your work, take care of things at home, or get along with other people?             Former smoker    I advised Basil of the risks of tobacco use.     Lab Results:   Admission on 03/17/2022, Discharged on 03/17/2022   Component Date Value Ref Range Status   • QT Interval 03/17/2022 415  ms Final   • TSH 03/17/2022 3.050  0.270 - 4.200 uIU/mL Final   • Amphet/Methamphet, Screen 03/17/2022 Negative  Negative Final   • Barbiturates Screen, Urine 03/17/2022 Negative  Negative Final   • Benzodiazepine Screen, Urine 03/17/2022 Negative  Negative Final   • Cocaine Screen, Urine 03/17/2022 Negative  Negative Final   • Opiate Screen 03/17/2022 Negative  Negative Final   • THC, Screen, Urine 03/17/2022 Positive (A) Negative Final   • Methadone Screen, Urine 03/17/2022 Negative  Negative Final   • Oxycodone Screen, Urine 03/17/2022 Negative  Negative Final   • Color, UA 03/17/2022 Yellow  Yellow, Straw Final   • Appearance, UA 03/17/2022 Clear  Clear Final   • pH, UA 03/17/2022 7.5  5.0 - 8.0 Final   • Specific Gravity, UA 03/17/2022 1.016  1.005 - 1.030 Final   • Glucose, UA 03/17/2022 Negative  Negative Final   • Ketones, UA 03/17/2022 Negative  Negative Final   • Bilirubin, UA  03/17/2022 Negative  Negative Final   • Blood, UA 03/17/2022 Negative  Negative Final   • Protein, UA 03/17/2022 Negative  Negative Final   • Leuk Esterase, UA 03/17/2022 Negative  Negative Final   • Nitrite, UA 03/17/2022 Negative  Negative Final   • Urobilinogen, UA 03/17/2022 0.2 E.U./dL  0.2 - 1.0 E.U./dL Final   • Glucose 03/17/2022 90  65 - 99 mg/dL Final   • BUN 03/17/2022 15  6 - 20 mg/dL Final   • Creatinine 03/17/2022 1.06  0.76 - 1.27 mg/dL Final   • Sodium 03/17/2022 137  136 - 145 mmol/L Final   • Potassium 03/17/2022 3.9  3.5 - 5.2 mmol/L Final   • Chloride 03/17/2022 103  98 - 107 mmol/L Final   • CO2 03/17/2022 25.0  22.0 - 29.0 mmol/L Final   • Calcium 03/17/2022 8.4 (A) 8.6 - 10.5 mg/dL Final   • BUN/Creatinine Ratio 03/17/2022 14.2  7.0 - 25.0 Final   • Anion Gap 03/17/2022 9.0  5.0 - 15.0 mmol/L Final   • eGFR 03/17/2022 93.3  >60.0 mL/min/1.73 Final    National Kidney Foundation and American Society of Nephrology (ASN) Task Force recommended calculation based on the Chronic Kidney Disease Epidemiology Collaboration (CKD-EPI) equation refit without adjustment for race.   • WBC 03/17/2022 4.80  3.40 - 10.80 10*3/mm3 Final   • RBC 03/17/2022 4.51  4.14 - 5.80 10*6/mm3 Final   • Hemoglobin 03/17/2022 14.5  13.0 - 17.7 g/dL Final   • Hematocrit 03/17/2022 41.8  37.5 - 51.0 % Final   • MCV 03/17/2022 92.8  79.0 - 97.0 fL Final   • MCH 03/17/2022 32.1  26.6 - 33.0 pg Final   • MCHC 03/17/2022 34.6  31.5 - 35.7 g/dL Final   • RDW 03/17/2022 12.4  12.3 - 15.4 % Final   • RDW-SD 03/17/2022 40.3  37.0 - 54.0 fl Final   • MPV 03/17/2022 8.7  6.0 - 12.0 fL Final   • Platelets 03/17/2022 154  140 - 450 10*3/mm3 Final   • Neutrophil % 03/17/2022 71.7  42.7 - 76.0 % Final   • Lymphocyte % 03/17/2022 19.6  19.6 - 45.3 % Final   • Monocyte % 03/17/2022 6.5  5.0 - 12.0 % Final   • Eosinophil % 03/17/2022 1.4  0.3 - 6.2 % Final   • Basophil % 03/17/2022 0.8  0.0 - 1.5 % Final   • Neutrophils, Absolute 03/17/2022  3.50  1.70 - 7.00 10*3/mm3 Final   • Lymphocytes, Absolute 03/17/2022 0.90  0.70 - 3.10 10*3/mm3 Final   • Monocytes, Absolute 03/17/2022 0.30  0.10 - 0.90 10*3/mm3 Final   • Eosinophils, Absolute 03/17/2022 0.10  0.00 - 0.40 10*3/mm3 Final   • Basophils, Absolute 03/17/2022 0.00  0.00 - 0.20 10*3/mm3 Final   • nRBC 03/17/2022 0.1  0.0 - 0.2 /100 WBC Final       Assessment/Plan   Problems Addressed this Visit        Mental Health    Generalized anxiety disorder (Chronic)    Relevant Medications    busPIRone (BUSPAR) 10 MG tablet    Bipolar affective disorder, mixed (HCC) - Primary (Chronic)    Relevant Medications    busPIRone (BUSPAR) 10 MG tablet      Diagnoses       Codes Comments    Bipolar affective disorder, mixed (HCC)    -  Primary ICD-10-CM: F31.60  ICD-9-CM: 296.60     Generalized anxiety disorder     ICD-10-CM: F41.1  ICD-9-CM: 300.02           Visit Diagnoses:    ICD-10-CM ICD-9-CM   1. Bipolar affective disorder, mixed (HCC)  F31.60 296.60   2. Generalized anxiety disorder  F41.1 300.02       TREATMENT PLAN/GOALS: Continue supportive psychotherapy efforts and medications as indicated. Treatment and medication options discussed during today's visit. Patient ackowledged and verbally consented to continue with current treatment plan and was educated on the importance of compliance with treatment and follow-up appointments.    MEDICATION ISSUES:  INSPECT reviewed as expected  Discussed medication options and treatment plan of prescribed medication as well as the risks, benefits, and side effects including potential falls, possible impaired driving and metabolic adversities among others. Patient is agreeable to call the office with any worsening of symptoms or onset of side effects. Patient is agreeable to call 911 or go to the nearest ER should he/she begin having SI/HI. No medication side effects or related complaints today.     Patient is doing better on Geodon than the Vraylar but still has moments when  he wants to spend  Continue Trileptal 300 twice daily for mood stabilizer  Continue Klonopin 1 mg BID prn anxiety  Continue Geodon 40mg Bid  Patient is a reduced converter of folic acid, add L-methylfolate down the road.      MEDS ORDERED DURING VISIT:  New Medications Ordered This Visit   Medications   • busPIRone (BUSPAR) 10 MG tablet     Sig: Take 1 tablet by mouth 2 (Two) Times a Day.     Dispense:  180 tablet     Refill:  0       Return in about 3 months (around 7/6/2022).      This document has been electronically signed by Kristy Reese PA-C  April 13, 2022 08:32 EDT

## 2022-06-10 ENCOUNTER — TELEPHONE (OUTPATIENT)
Dept: FAMILY MEDICINE CLINIC | Facility: CLINIC | Age: 37
End: 2022-06-10

## 2022-06-10 ENCOUNTER — PATIENT MESSAGE (OUTPATIENT)
Dept: FAMILY MEDICINE CLINIC | Facility: CLINIC | Age: 37
End: 2022-06-10

## 2022-06-10 DIAGNOSIS — E03.9 HYPOTHYROIDISM, ADULT: Primary | ICD-10-CM

## 2022-06-10 RX ORDER — LEVOTHYROXINE SODIUM 0.07 MG/1
75 TABLET ORAL
Qty: 30 TABLET | Refills: 1 | Status: SHIPPED | OUTPATIENT
Start: 2022-06-10 | End: 2022-07-04

## 2022-06-10 NOTE — TELEPHONE ENCOUNTER
Caller: Basil Alarcon Jr    Relationship to patient: Self    Best call back number: [phone]    Patient is needing: PATIENT CALLED AND SAID HE WAS NEEDING HIS LEVOTHYROXINE REFILLED    HE SAID HE CALLED THE PHARMACY AROUND 4:45 TODAY AND THEY SAID THEY DID NOT HAVE A PRESCRIPTION READY FOR HIM    HE SAID EARLIER IN THE DAY THEY HAD TOLD HIM HE WOULD NEED TO SEE HIS PROVIDER FIRST     HE IS NOT SURE WHY THEY ARE SAYING THEY DON'T HAVE IT NOW

## 2022-06-10 NOTE — TELEPHONE ENCOUNTER
From: Basil Alarcon Jr  To: OLIVIA Mobley  Sent: 6/10/2022 1:11 PM EDT  Subject: Levothyroxin    Hello,    Can you send this levothyroxin prescription in to the [facility] on [address]? I had my levels checked in the ER and they were good. I've already been out for a couple weeks because [facility] never sent in the request I think.    Thank you,  Basil

## 2022-07-03 DIAGNOSIS — E03.9 HYPOTHYROIDISM, ADULT: ICD-10-CM

## 2022-07-04 RX ORDER — LEVOTHYROXINE SODIUM 0.07 MG/1
TABLET ORAL
Qty: 30 TABLET | Refills: 1 | Status: SHIPPED | OUTPATIENT
Start: 2022-07-04 | End: 2022-08-05

## 2022-07-14 ENCOUNTER — OFFICE VISIT (OUTPATIENT)
Dept: FAMILY MEDICINE CLINIC | Facility: CLINIC | Age: 37
End: 2022-07-14

## 2022-07-14 VITALS
SYSTOLIC BLOOD PRESSURE: 126 MMHG | WEIGHT: 141 LBS | DIASTOLIC BLOOD PRESSURE: 80 MMHG | BODY MASS INDEX: 22.08 KG/M2 | RESPIRATION RATE: 16 BRPM | OXYGEN SATURATION: 98 % | HEART RATE: 72 BPM

## 2022-07-14 DIAGNOSIS — L30.9 DERMATITIS: ICD-10-CM

## 2022-07-14 DIAGNOSIS — F31.60 BIPOLAR AFFECTIVE DISORDER, MIXED: Primary | Chronic | ICD-10-CM

## 2022-07-14 PROCEDURE — 99213 OFFICE O/P EST LOW 20 MIN: CPT | Performed by: NURSE PRACTITIONER

## 2022-07-14 RX ORDER — OXCARBAZEPINE 300 MG/1
300 TABLET, FILM COATED ORAL 2 TIMES DAILY
Qty: 180 TABLET | Refills: 0 | Status: SHIPPED | OUTPATIENT
Start: 2022-07-14 | End: 2022-10-10

## 2022-07-14 NOTE — PROGRESS NOTES
Chief Complaint  Rash    Subjective          Basil U Agnes Aldana presents to [facility]  History of Present Illness    Is here today needing refill of oxcarbazepine rx as he was fired from his psychiatrist practice for missing appointments    Has seen urology for a decreased urine flow, work up done, ws offered flomax but he declined because he does not want another pill to take    The rashes on his forearms started since the weather turned warmer and he started wearing short sleeves.  He thought/thinks it could be related to his work, but doesn't know what is bothering him.    Review of Systems   Constitutional: Negative for fatigue and fever.   Genitourinary: Positive for decreased urine volume.   Skin: Positive for rash.        Intermittent small red bumps on the forearms - resolves with washing and application of hydrocortisone cream   Psychiatric/Behavioral: Positive for dysphoric mood and sleep disturbance. The patient is nervous/anxious.         Tells me that his moods are well controlled on current medicine regimen     Objective   Vital Signs:  /80 (BP Location: Left arm, Patient Position: Sitting)   Pulse 72   Resp 16   Wt 64 kg (141 lb)   SpO2 98%   BMI 22.08 kg/m²     BP Readings from Last 3 Encounters:   07/14/22 126/80   03/18/22 117/71   03/17/22 129/60        Wt Readings from Last 3 Encounters:   07/14/22 64 kg (141 lb)   03/18/22 67 kg (147 lb 9.6 oz)   03/17/22 68 kg (150 lb)              Physical Exam  Vitals reviewed.   Constitutional:       Appearance: Normal appearance.   Cardiovascular:      Rate and Rhythm: Normal rate and regular rhythm.      Heart sounds: Normal heart sounds.   Pulmonary:      Effort: Pulmonary effort is normal.      Breath sounds: Normal breath sounds.   Musculoskeletal:         General: Normal range of motion.   Skin:     General: Skin is warm.      Comments: No rash present at this visit today   Neurological:      Mental  Status: He is alert and oriented to person, place, and time.        Result Review :                 Assessment and Plan    Diagnoses and all orders for this visit:    1. Bipolar affective disorder, mixed (HCC) (Primary)  -     OXcarbazepine (TRILEPTAL) 300 MG tablet; Take 1 tablet by mouth 2 (Two) Times a Day.  Dispense: 180 tablet; Refill: 0    2. Dermatitis           Follow Up   Return in about 6 months (around 1/14/2023) for Annual physical.  Patient was given instructions and counseling regarding his condition or for health maintenance advice. Please see specific information pulled into the AVS if appropriate.

## 2022-08-04 DIAGNOSIS — E03.9 HYPOTHYROIDISM, ADULT: ICD-10-CM

## 2022-08-05 RX ORDER — LEVOTHYROXINE SODIUM 0.07 MG/1
TABLET ORAL
Qty: 90 TABLET | Refills: 1 | Status: SHIPPED | OUTPATIENT
Start: 2022-08-05 | End: 2022-12-11

## 2022-09-15 RX ORDER — ZIPRASIDONE HYDROCHLORIDE 40 MG/1
40 CAPSULE ORAL 2 TIMES DAILY WITH MEALS
Qty: 180 CAPSULE | Refills: 1 | OUTPATIENT
Start: 2022-09-15

## 2022-09-18 RX ORDER — ZIPRASIDONE HYDROCHLORIDE 40 MG/1
40 CAPSULE ORAL 2 TIMES DAILY WITH MEALS
Qty: 60 CAPSULE | Refills: 0 | Status: SHIPPED | OUTPATIENT
Start: 2022-09-18 | End: 2022-11-09 | Stop reason: SDUPTHER

## 2022-10-09 DIAGNOSIS — F31.60 BIPOLAR AFFECTIVE DISORDER, MIXED: Chronic | ICD-10-CM

## 2022-10-10 RX ORDER — OXCARBAZEPINE 300 MG/1
TABLET, FILM COATED ORAL
Qty: 180 TABLET | Refills: 0 | Status: SHIPPED | OUTPATIENT
Start: 2022-10-10 | End: 2023-01-23

## 2022-11-01 RX ORDER — ZIPRASIDONE HYDROCHLORIDE 40 MG/1
40 CAPSULE ORAL 2 TIMES DAILY WITH MEALS
Qty: 60 CAPSULE | Refills: 0 | OUTPATIENT
Start: 2022-11-01

## 2022-11-09 RX ORDER — ZIPRASIDONE HYDROCHLORIDE 40 MG/1
40 CAPSULE ORAL 2 TIMES DAILY WITH MEALS
Qty: 60 CAPSULE | Refills: 0 | Status: SHIPPED | OUTPATIENT
Start: 2022-11-09 | End: 2022-12-06

## 2022-11-15 ENCOUNTER — TELEMEDICINE (OUTPATIENT)
Dept: PSYCHIATRY | Facility: CLINIC | Age: 37
End: 2022-11-15

## 2022-11-15 DIAGNOSIS — F31.77 BIPOLAR DISORDER, IN PARTIAL REMISSION, MOST RECENT EPISODE MIXED: Primary | Chronic | ICD-10-CM

## 2022-11-15 DIAGNOSIS — F41.1 GENERALIZED ANXIETY DISORDER: Chronic | ICD-10-CM

## 2022-11-15 PROCEDURE — 99214 OFFICE O/P EST MOD 30 MIN: CPT | Performed by: PHYSICIAN ASSISTANT

## 2022-11-15 RX ORDER — CLONAZEPAM 0.5 MG/1
0.5 TABLET ORAL 2 TIMES DAILY PRN
Qty: 60 TABLET | Refills: 0 | Status: SHIPPED | OUTPATIENT
Start: 2022-11-15 | End: 2023-04-06 | Stop reason: SDUPTHER

## 2022-11-15 NOTE — PROGRESS NOTES
Subjective   Basil Alarcon Jr is a 37 y.o.white male who presents today for follow up via telehealth    This provider is located in [place], [place] using a secure [employer] Video Visit through [employer]. Patient is being seen remotely via telehealth at their home address in [place], and stated they are in a secure environment for this session. The patient's condition being diagnosed/treated is appropriate for telemedicine. The provider identified herself as well as her credentials.   The patient, and/or patients guardian, consent to be seen remotely, and when consent is given they understand that the consent allows for patient identifiable information to be sent to a third party as needed.   They may refuse to be seen remotely at any time. The electronic data is encrypted and password protected, and the patient and/or guardian has been advised of the potential risks to privacy not withstanding such measures.   PT Identifiers used: Name and .    You have chosen to receive care through a telehealth visit.  Do you consent to use a video/audio connection for your medical care today? Yes      Chief Complaint:  Bipolar, anxiety    History of Present Illness:   Baby born on 2022 and had omphalocele (Luis), had surgery to put the organs back in, infection in the incision, should go home soon, parents were keeping their daughter, but his Dad fell and disrupted his hip replacement and having surgery again this Friday  Panic attacks on occasion, prefers the klonopin 0.5 mg over the 1 mg tabs so he can cut in half when needed.   He is only taking the Trileptal and Geodon, rarely takes the Klonopin b/c doesn't want to get addicted  Geodon controls the bipolar sxs better than the Vraylar, takes a nap in the afternoon.  Got remarried in 2022 and she got pregnant right away, his first child, she has a 4yr old daughter.   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  Denies SI/HI  No yvonne or hypomania.               The following portions of the patient's history were reviewed and updated as appropriate: allergies, current medications, past family history, past medical history, past social history, past surgical history and problem list.    PAST PSYCHIATRIC HISTORY  Axis I  Affective/Bipoloar Disorder, Anxiety Disorder  Axis II  None    PAST OUTPATIENT TREATMENT  Diagnosis treated:  Affective Disorder, Anxiety Disorder  Treatment Type:  Individual Therapy, Medication Management  Genesight testing done March 2019  Prior Psychiatric Medications:  Prozac, depression worse  Paxil  Lexapro  Buspar  Hydroxyzine, felt weird   Abilfiy  Ambien, felt weird  Vraylar, made his chest tight   Trileptal  Klonopin  Geodon  Viibryid  Support Groups:  None  Sequelae Of Mental Disorder:  job disruption, family disruption, emotional distress, financial hardships      Interval History  Improved    Side Effects  Lexapro triggered yvonne    Past psychiatric history reviewed and compared to 4/6/22 visit and appropriate updates were made.    Past Medical History:  Past Medical History:   Diagnosis Date   • Anxiety 2018    FOLLOWED BY PSYCH: SARAH DUNLAP PA-C   • Bipolar 2 disorder (HCC) 2018    FOLLOWED BY PSYCH: SARAH DUNLAP PA-C   • Bradycardia 07/2020    DR COLLADO, CARDIO   • Depression 2018    FOLLOWED BY PSYCH: SARAH DUNLAP PA-C   • Disease of thyroid gland 2013    HYPOTHYROID   • Former tobacco use 2018    0.5 PPD / QUIT   • Genital herpes 2013   • History of medical problems 2011   • Hyperlipidemia 2018   • Hypothyroidism 2013   • Kidney stone 09/01/2013    [place] [facility]   • Mucocele of lip 03/08/2022    Bit inside of his lip 3 wks ago - right side, inside lower mucosa   • Substance abuse (HCC) 09/01/2013    POS THC/MARIJUANA USE   • Syncope and collapse 03/17/2022    [facility] ED;  prev saw Dr. Collado, Cardio   • Urinary tract infection 09/01/2013    [facility]       Social History:  Social History     Socioeconomic History   • Marital status:      Spouse name: CRISTIAN PARIKH   Tobacco Use   • Smoking status: Former     Packs/day: 0.50     Types: Cigarettes   • Smokeless tobacco: Never   Vaping Use   • Vaping Use: Never used   Substance and Sexual Activity   • Alcohol use: No   • Drug use: Yes     Types: Marijuana     Comment: DAILY;  LAST USE 3/16/22 PM   • Sexual activity: Yes     Partners: Female     Comment: DENIES HIGH-RISK SEXUAL BEHAVIORS       Family History:  Family History   Problem Relation Age of Onset   • Depression Mother    • Thyroid disease Mother    • Anxiety disorder Mother    • Bipolar disorder Mother    • Mental illness Mother    • Thyroid disease Sister    • Mental illness Sister    • Anxiety disorder Sister    • Depression Sister    • Bipolar disorder Sister        Past Surgical History:  No past surgical history on file.    Problem List:  Patient Active Problem List   Diagnosis   • Generalized anxiety disorder   • Genital herpes   • Hypercholesterolemia   • Hypothyroidism, adult   • Bipolar disorder, in partial remission, most recent episode mixed (HCC)   • Bipolar 2 disorder (HCC)       Allergy:   No Known Allergies     Discontinued Medications:  Medications Discontinued During This Encounter   Medication Reason   • clonazePAM (KlonoPIN) 1 MG tablet Reorder       Current Medications:   Current Outpatient Medications   Medication Sig Dispense Refill   • clonazePAM (KlonoPIN) 0.5 MG tablet Take 1 tablet by mouth 2 (Two) Times a Day As Needed for Anxiety. 60 tablet 0   • busPIRone (BUSPAR) 10 MG tablet Take 1 tablet by mouth 2 (Two) Times a Day. 180 tablet 0   • levothyroxine (SYNTHROID, LEVOTHROID) 75 MCG tablet TAKE 1 TABLET BY MOUTH EVERY DAY BEFORE BREAKFAST 90 tablet 1   • OXcarbazepine (TRILEPTAL) 300 MG tablet TAKE 1 TABLET BY MOUTH TWICE A  tablet 0   • valACYclovir (VALTREX) 500 MG tablet Take 500 mg by mouth 3 (Three)  Times a Day As Needed. prn     • ziprasidone (GEODON) 40 MG capsule Take 1 capsule by mouth 2 (Two) Times a Day With Meals. 60 capsule 0     No current facility-administered medications for this visit.         Review of Symptoms:    Psychiatric/Behavioral: Negative for agitation, behavioral problems, confusion, decreased concentration, dysphoric mood, hallucinations, self-injury, sleep disturbance and suicidal ideas. The patient is not nervous/anxious and is not hyperactive.        Physical Exam:   There were no vitals taken for this visit.    Mental Status Exam:   Hygiene: Good  Cooperation:  Cooperative  Eye Contact: Good  Psychomotor Behavior:  Appropriate  Affect:  Full range  Mood: normal  Hopelessness: Denies  Speech:  Normal  Thought Process:  Goal directed  Thought Content:  Normal  Suicidal:  None  Homicidal:  None  Hallucinations:  None  Delusion:  None  Memory:  Intact  Orientation:  Person, Place, Time and Situation  Reliability:  good  Insight:  Good  Judgement:  Good  Impulse Control:  Good  Physical/Medical Issues:  Yes Hypothyroidism     Mental status exam was reviewed and compared to 4/6/22 visit and appropriate updates were made.    PHQ-9 Depression Screening  Little interest or pleasure in doing things? (P) 0   Feeling down, depressed, or hopeless? (P) 0   Trouble falling or staying asleep, or sleeping too much? (P) 0   Feeling tired or having little energy? (P) 0   Poor appetite or overeating? (P) 0   Feeling bad about yourself - or that you are a failure or have let yourself or your family down? (P) 0   Trouble concentrating on things, such as reading the newspaper or watching television? (P) 0   Moving or speaking so slowly that other people could have noticed? Or the opposite - being so fidgety or restless that you have been moving around a lot more than usual? (P) 0   Thoughts that you would be better off dead, or of hurting yourself in some way? (P) 0   PHQ-9 Total Score (P) 0   If you  checked off any problems, how difficult have these problems made it for you to do your work, take care of things at home, or get along with other people? (P) Not difficult at all           Former smoker    I advised Basil of the risks of tobacco use.     Lab Results:   No visits with results within 3 Month(s) from this visit.   Latest known visit with results is:   Admission on 03/17/2022, Discharged on 03/17/2022   Component Date Value Ref Range Status   • QT Interval 03/17/2022 415  ms Final   • TSH 03/17/2022 3.050  0.270 - 4.200 uIU/mL Final   • Amphet/Methamphet, Screen 03/17/2022 Negative  Negative Final   • Barbiturates Screen, Urine 03/17/2022 Negative  Negative Final   • Benzodiazepine Screen, Urine 03/17/2022 Negative  Negative Final   • Cocaine Screen, Urine 03/17/2022 Negative  Negative Final   • Opiate Screen 03/17/2022 Negative  Negative Final   • THC, Screen, Urine 03/17/2022 Positive (A)  Negative Final   • Methadone Screen, Urine 03/17/2022 Negative  Negative Final   • Oxycodone Screen, Urine 03/17/2022 Negative  Negative Final   • Color, UA 03/17/2022 Yellow  Yellow, Straw Final   • Appearance, UA 03/17/2022 Clear  Clear Final   • pH, UA 03/17/2022 7.5  5.0 - 8.0 Final   • Specific Gravity, UA 03/17/2022 1.016  1.005 - 1.030 Final   • Glucose, UA 03/17/2022 Negative  Negative Final   • Ketones, UA 03/17/2022 Negative  Negative Final   • Bilirubin, UA 03/17/2022 Negative  Negative Final   • Blood, UA 03/17/2022 Negative  Negative Final   • Protein, UA 03/17/2022 Negative  Negative Final   • Leuk Esterase, UA 03/17/2022 Negative  Negative Final   • Nitrite, UA 03/17/2022 Negative  Negative Final   • Urobilinogen, UA 03/17/2022 0.2 E.U./dL  0.2 - 1.0 E.U./dL Final   • Glucose 03/17/2022 90  65 - 99 mg/dL Final   • BUN 03/17/2022 15  6 - 20 mg/dL Final   • Creatinine 03/17/2022 1.06  0.76 - 1.27 mg/dL Final   • Sodium 03/17/2022 137  136 - 145 mmol/L Final   • Potassium 03/17/2022 3.9  3.5 - 5.2 mmol/L  Final   • Chloride 03/17/2022 103  98 - 107 mmol/L Final   • CO2 03/17/2022 25.0  22.0 - 29.0 mmol/L Final   • Calcium 03/17/2022 8.4 (L)  8.6 - 10.5 mg/dL Final   • BUN/Creatinine Ratio 03/17/2022 14.2  7.0 - 25.0 Final   • Anion Gap 03/17/2022 9.0  5.0 - 15.0 mmol/L Final   • eGFR 03/17/2022 93.3  >60.0 mL/min/1.73 Final    National Kidney Foundation and American Society of Nephrology (ASN) Task Force recommended calculation based on the Chronic Kidney Disease Epidemiology Collaboration (CKD-EPI) equation refit without adjustment for race.   • WBC 03/17/2022 4.80  3.40 - 10.80 10*3/mm3 Final   • RBC 03/17/2022 4.51  4.14 - 5.80 10*6/mm3 Final   • Hemoglobin 03/17/2022 14.5  13.0 - 17.7 g/dL Final   • Hematocrit 03/17/2022 41.8  37.5 - 51.0 % Final   • MCV 03/17/2022 92.8  79.0 - 97.0 fL Final   • MCH 03/17/2022 32.1  26.6 - 33.0 pg Final   • MCHC 03/17/2022 34.6  31.5 - 35.7 g/dL Final   • RDW 03/17/2022 12.4  12.3 - 15.4 % Final   • RDW-SD 03/17/2022 40.3  37.0 - 54.0 fl Final   • MPV 03/17/2022 8.7  6.0 - 12.0 fL Final   • Platelets 03/17/2022 154  140 - 450 10*3/mm3 Final   • Neutrophil % 03/17/2022 71.7  42.7 - 76.0 % Final   • Lymphocyte % 03/17/2022 19.6  19.6 - 45.3 % Final   • Monocyte % 03/17/2022 6.5  5.0 - 12.0 % Final   • Eosinophil % 03/17/2022 1.4  0.3 - 6.2 % Final   • Basophil % 03/17/2022 0.8  0.0 - 1.5 % Final   • Neutrophils, Absolute 03/17/2022 3.50  1.70 - 7.00 10*3/mm3 Final   • Lymphocytes, Absolute 03/17/2022 0.90  0.70 - 3.10 10*3/mm3 Final   • Monocytes, Absolute 03/17/2022 0.30  0.10 - 0.90 10*3/mm3 Final   • Eosinophils, Absolute 03/17/2022 0.10  0.00 - 0.40 10*3/mm3 Final   • Basophils, Absolute 03/17/2022 0.00  0.00 - 0.20 10*3/mm3 Final   • nRBC 03/17/2022 0.1  0.0 - 0.2 /100 WBC Final       Assessment & Plan   Problems Addressed this Visit        Mental Health    Generalized anxiety disorder (Chronic)    Relevant Medications    clonazePAM (KlonoPIN) 0.5 MG tablet    Bipolar  disorder, in partial remission, most recent episode mixed (HCC) - Primary (Chronic)   Diagnoses       Codes Comments    Bipolar disorder, in partial remission, most recent episode mixed (HCC)    -  Primary ICD-10-CM: F31.77  ICD-9-CM: 296.65     Generalized anxiety disorder     ICD-10-CM: F41.1  ICD-9-CM: 300.02           Visit Diagnoses:    ICD-10-CM ICD-9-CM   1. Bipolar disorder, in partial remission, most recent episode mixed (HCC)  F31.77 296.65   2. Generalized anxiety disorder  F41.1 300.02       TREATMENT PLAN/GOALS: Continue supportive psychotherapy efforts and medications as indicated. Treatment and medication options discussed during today's visit. Patient ackowledged and verbally consented to continue with current treatment plan and was educated on the importance of compliance with treatment and follow-up appointments.    MEDICATION ISSUES:  INSPECT reviewed as expected  Discussed medication options and treatment plan of prescribed medication as well as the risks, benefits, and side effects including potential falls, possible impaired driving and metabolic adversities among others. Patient is agreeable to call the office with any worsening of symptoms or onset of side effects. Patient is agreeable to call 911 or go to the nearest ER should he/she begin having SI/HI. No medication side effects or related complaints today.     Patient is doing well, but anxious with baby in NICU x 1 month.    Continue Trileptal 300 twice daily for mood stabilizer  Decrease Klonopin 1 mg to 0.5 mg tabs at patient request,  BID prn anxiety  Continue Geodon 40mg Bid with meals  Continue Buspar 10 mg BID for anxiety  Patient is a reduced converter of folic acid, add L-methylfolate down the road.      MEDS ORDERED DURING VISIT:  New Medications Ordered This Visit   Medications   • clonazePAM (KlonoPIN) 0.5 MG tablet     Sig: Take 1 tablet by mouth 2 (Two) Times a Day As Needed for Anxiety.     Dispense:  60 tablet     Refill:   0       Return in about 6 months (around 5/15/2023) for video visit.      This document has been electronically signed by Kristy Reese PA-C  November 15, 2022 16:19 EST

## 2022-12-06 RX ORDER — ZIPRASIDONE HYDROCHLORIDE 40 MG/1
40 CAPSULE ORAL 2 TIMES DAILY WITH MEALS
Qty: 180 CAPSULE | Refills: 1 | Status: SHIPPED | OUTPATIENT
Start: 2022-12-06

## 2022-12-11 DIAGNOSIS — E03.9 HYPOTHYROIDISM, ADULT: ICD-10-CM

## 2022-12-11 RX ORDER — LEVOTHYROXINE SODIUM 0.07 MG/1
TABLET ORAL
Qty: 90 TABLET | Refills: 1 | Status: SHIPPED | OUTPATIENT
Start: 2022-12-11 | End: 2023-03-06 | Stop reason: SDUPTHER

## 2023-01-21 DIAGNOSIS — F31.60 BIPOLAR AFFECTIVE DISORDER, MIXED: Chronic | ICD-10-CM

## 2023-01-23 RX ORDER — OXCARBAZEPINE 300 MG/1
TABLET, FILM COATED ORAL
Qty: 180 TABLET | Refills: 0 | Status: SHIPPED | OUTPATIENT
Start: 2023-01-23

## 2023-03-06 DIAGNOSIS — E03.9 HYPOTHYROIDISM, ADULT: ICD-10-CM

## 2023-03-06 RX ORDER — LEVOTHYROXINE SODIUM 0.07 MG/1
75 TABLET ORAL
Qty: 90 TABLET | Refills: 1 | Status: SHIPPED | OUTPATIENT
Start: 2023-03-06

## 2023-03-09 ENCOUNTER — OFFICE VISIT (OUTPATIENT)
Dept: FAMILY MEDICINE CLINIC | Facility: CLINIC | Age: 38
End: 2023-03-09
Payer: COMMERCIAL

## 2023-03-09 ENCOUNTER — LAB (OUTPATIENT)
Dept: FAMILY MEDICINE CLINIC | Facility: CLINIC | Age: 38
End: 2023-03-09
Payer: COMMERCIAL

## 2023-03-09 VITALS
SYSTOLIC BLOOD PRESSURE: 140 MMHG | HEART RATE: 83 BPM | DIASTOLIC BLOOD PRESSURE: 80 MMHG | BODY MASS INDEX: 23.7 KG/M2 | RESPIRATION RATE: 18 BRPM | WEIGHT: 151 LBS | OXYGEN SATURATION: 96 % | HEIGHT: 67 IN

## 2023-03-09 DIAGNOSIS — R53.83 FATIGUE, UNSPECIFIED TYPE: ICD-10-CM

## 2023-03-09 DIAGNOSIS — E03.9 HYPOTHYROIDISM, ADULT: ICD-10-CM

## 2023-03-09 DIAGNOSIS — E03.9 HYPOTHYROIDISM, ADULT: Primary | ICD-10-CM

## 2023-03-09 DIAGNOSIS — R79.89 ELEVATED LIVER FUNCTION TESTS: ICD-10-CM

## 2023-03-09 LAB
25(OH)D3 SERPL-MCNC: 26.1 NG/ML (ref 30–100)
ALBUMIN SERPL-MCNC: 4.4 G/DL (ref 3.5–5.2)
ALBUMIN/GLOB SERPL: 1.7 G/DL
ALP SERPL-CCNC: 58 U/L (ref 39–117)
ALT SERPL W P-5'-P-CCNC: 191 U/L (ref 1–41)
ANION GAP SERPL CALCULATED.3IONS-SCNC: 9 MMOL/L (ref 5–15)
AST SERPL-CCNC: 114 U/L (ref 1–40)
BILIRUB SERPL-MCNC: 0.2 MG/DL (ref 0–1.2)
BUN SERPL-MCNC: 15 MG/DL (ref 6–20)
BUN/CREAT SERPL: 15.2 (ref 7–25)
CALCIUM SPEC-SCNC: 9.3 MG/DL (ref 8.6–10.5)
CHLORIDE SERPL-SCNC: 103 MMOL/L (ref 98–107)
CO2 SERPL-SCNC: 28 MMOL/L (ref 22–29)
CREAT SERPL-MCNC: 0.99 MG/DL (ref 0.76–1.27)
DEPRECATED RDW RBC AUTO: 38.9 FL (ref 37–54)
EGFRCR SERPLBLD CKD-EPI 2021: 100.6 ML/MIN/1.73
ERYTHROCYTE [DISTWIDTH] IN BLOOD BY AUTOMATED COUNT: 11.5 % (ref 12.3–15.4)
GLOBULIN UR ELPH-MCNC: 2.6 GM/DL
GLUCOSE SERPL-MCNC: 80 MG/DL (ref 65–99)
HCT VFR BLD AUTO: 43.2 % (ref 37.5–51)
HGB BLD-MCNC: 14.9 G/DL (ref 13–17.7)
MCH RBC QN AUTO: 31.6 PG (ref 26.6–33)
MCHC RBC AUTO-ENTMCNC: 34.5 G/DL (ref 31.5–35.7)
MCV RBC AUTO: 91.7 FL (ref 79–97)
PLATELET # BLD AUTO: 214 10*3/MM3 (ref 140–450)
PMV BLD AUTO: 11.5 FL (ref 6–12)
POTASSIUM SERPL-SCNC: 4.6 MMOL/L (ref 3.5–5.2)
PROT SERPL-MCNC: 7 G/DL (ref 6–8.5)
RBC # BLD AUTO: 4.71 10*6/MM3 (ref 4.14–5.8)
SODIUM SERPL-SCNC: 140 MMOL/L (ref 136–145)
TSH SERPL DL<=0.05 MIU/L-ACNC: 4.12 UIU/ML (ref 0.27–4.2)
VIT B12 BLD-MCNC: 564 PG/ML (ref 211–946)
WBC NRBC COR # BLD: 4.58 10*3/MM3 (ref 3.4–10.8)

## 2023-03-09 PROCEDURE — 84443 ASSAY THYROID STIM HORMONE: CPT | Performed by: NURSE PRACTITIONER

## 2023-03-09 PROCEDURE — 85027 COMPLETE CBC AUTOMATED: CPT | Performed by: NURSE PRACTITIONER

## 2023-03-09 PROCEDURE — 36415 COLL VENOUS BLD VENIPUNCTURE: CPT

## 2023-03-09 PROCEDURE — 80053 COMPREHEN METABOLIC PANEL: CPT | Performed by: NURSE PRACTITIONER

## 2023-03-09 PROCEDURE — 99214 OFFICE O/P EST MOD 30 MIN: CPT | Performed by: NURSE PRACTITIONER

## 2023-03-09 PROCEDURE — 82306 VITAMIN D 25 HYDROXY: CPT | Performed by: NURSE PRACTITIONER

## 2023-03-09 PROCEDURE — 82607 VITAMIN B-12: CPT | Performed by: NURSE PRACTITIONER

## 2023-03-09 NOTE — PROGRESS NOTES
"Chief Complaint  Med Refill and Cyst (On wrist)    Subjective          Basil Alarcon Jr presents to [facility]  History of Present Illness    Is here today for medication refills    He ran out of thyroid medication a few weeks ago and his anxiety has been improved  He has been taking some of his mother's thyroid medication and his anxiety is worsening again    He endorses that he wants to sleep a lot when he is not on his levothyroxine which is why he \"borrowed\" from his mother    He also having a cyst on the left wrist, appears to be a ganglion type cyst, is not painful or bothersome unless he bothers it much and then it will be tender after - offered referral to hand specialist for eval and treat, he prefers to wait at this time    Review of Systems   Constitutional: Positive for fatigue.   Respiratory: Negative.  Negative for shortness of breath and wheezing.    Cardiovascular: Negative for chest pain and palpitations.   Gastrointestinal: Negative.  Negative for abdominal pain, constipation and diarrhea.   Genitourinary:        Vergara had some urinary symptoms, has seen the urologist, has been prescribed flomax but he has not started taking it    Musculoskeletal:        Endorses that he is having back and bilateral knee pain since yesterday, feels like it is a result of not being on his thyroid medication, he gets achy   Allergic/Immunologic: Negative for environmental allergies.   Neurological: Negative.  Negative for dizziness, weakness and headaches.   Psychiatric/Behavioral: Negative for agitation, dysphoric mood and sleep disturbance. The patient is nervous/anxious.         He endorses panic attacks       Objective   Vital Signs:  /80 (BP Location: Right arm, Patient Position: Sitting)   Pulse 83   Resp 18   Ht 170.2 cm (67.01\")   Wt 68.5 kg (151 lb)   SpO2 96%   BMI 23.64 kg/m²     BP Readings from Last 3 Encounters:   03/09/23 140/80   07/14/22 126/80 "   03/18/22 117/71        Wt Readings from Last 3 Encounters:   03/09/23 68.5 kg (151 lb)   07/14/22 64 kg (141 lb)   03/18/22 67 kg (147 lb 9.6 oz)              Physical Exam  Vitals reviewed.   Constitutional:       Appearance: Normal appearance.   Neck:      Thyroid: No thyromegaly.      Vascular: No carotid bruit.   Cardiovascular:      Rate and Rhythm: Normal rate and regular rhythm.      Pulses: Normal pulses.      Heart sounds: Normal heart sounds.   Pulmonary:      Effort: Pulmonary effort is normal.      Breath sounds: Normal breath sounds.   Musculoskeletal:         General: Normal range of motion.      Cervical back: Neck supple. No tenderness.      Right lower leg: No edema.      Left lower leg: No edema.   Lymphadenopathy:      Cervical: No cervical adenopathy.   Skin:     General: Skin is warm.   Neurological:      Mental Status: He is alert and oriented to person, place, and time.   Psychiatric:         Mood and Affect: Mood normal.        Result Review :                 Assessment and Plan    Diagnoses and all orders for this visit:    1. Hypothyroidism, adult (Primary)  -     TSH; Future  -     Comprehensive metabolic panel; Future  -     Ambulatory Referral to Endocrinology    2. Fatigue, unspecified type  -     Comprehensive metabolic panel; Future  -     Vitamin D 25 hydroxy; Future  -     CBC No Differential; Future  -     Vitamin B12; Future             Follow Up   Return in about 1 year (around 3/9/2024), or as needed, for Annual physical.  Patient was given instructions and counseling regarding his condition or for health maintenance advice. Please see specific information pulled into the AVS if appropriate.

## 2023-03-10 ENCOUNTER — TELEPHONE (OUTPATIENT)
Dept: FAMILY MEDICINE CLINIC | Facility: CLINIC | Age: 38
End: 2023-03-10
Payer: COMMERCIAL

## 2023-03-10 NOTE — TELEPHONE ENCOUNTER
----- Message from OLIVIA Denton sent at 3/10/2023 12:31 PM EST -----  Please verify that he sees this message, if he does not, please call him with it.  Thank you    Basli,  Your labs are as follows:  Vitamin D is low at 26.1, normal is , you can bring this up with an over the counter dose of 2000 units of vitamin d 3 once daily.  Blood counts are all fine.  Vitamin B12 is within normal.  Thyroid lab is within normal.  I would have you stop the levothyroxine and we can recheck it when you have  been off of it entirely.  Metabolic panel - blood sugar, electrolytes and kidney function labs are all in the normal range, your liver function labs ALT and AST are elevated.   This can be caused by many things some of which include fatty liver disease, a diet high in fried and fatty foods, alcohol consumption, some medicines such as tylenol/acetaminophen, and viral illness.  Please avoid any of the above that you can such as alcohol, tylenol, and fried foods, and return for a repeat lab at your convenience in 2 weeks.    We can also repeat the thyroid lab at that time.

## 2023-03-28 ENCOUNTER — TELEPHONE (OUTPATIENT)
Dept: FAMILY MEDICINE CLINIC | Facility: CLINIC | Age: 38
End: 2023-03-28
Payer: COMMERCIAL

## 2023-03-28 NOTE — TELEPHONE ENCOUNTER
hub staff attempted to follow warm transfer process and was unsuccessful     Caller: Basil Alarcon Jr    Relationship to patient: Self    Best call back number: [phone]    Patient is needing:     WOULD LIKE TO SCHEDULE LABS

## 2023-03-28 NOTE — TELEPHONE ENCOUNTER
Spoke with patient and scheduled a [facility] lab appt for tomorrow at 9:40am.

## 2023-03-29 ENCOUNTER — LAB (OUTPATIENT)
Dept: FAMILY MEDICINE CLINIC | Facility: CLINIC | Age: 38
End: 2023-03-29
Payer: COMMERCIAL

## 2023-03-29 DIAGNOSIS — R79.89 ELEVATED LIVER FUNCTION TESTS: ICD-10-CM

## 2023-03-29 DIAGNOSIS — E03.9 HYPOTHYROIDISM, ADULT: ICD-10-CM

## 2023-03-29 LAB
ALBUMIN SERPL-MCNC: 4.7 G/DL (ref 3.5–5.2)
ALP SERPL-CCNC: 68 U/L (ref 39–117)
ALT SERPL W P-5'-P-CCNC: 45 U/L (ref 1–41)
AST SERPL-CCNC: 22 U/L (ref 1–40)
BILIRUB CONJ SERPL-MCNC: <0.2 MG/DL (ref 0–0.3)
BILIRUB INDIRECT SERPL-MCNC: ABNORMAL MG/DL
BILIRUB SERPL-MCNC: 0.3 MG/DL (ref 0–1.2)
PROT SERPL-MCNC: 7.1 G/DL (ref 6–8.5)
T4 FREE SERPL-MCNC: 0.76 NG/DL (ref 0.93–1.7)
TSH SERPL DL<=0.05 MIU/L-ACNC: 6.82 UIU/ML (ref 0.27–4.2)

## 2023-03-29 PROCEDURE — 84443 ASSAY THYROID STIM HORMONE: CPT | Performed by: NURSE PRACTITIONER

## 2023-03-29 PROCEDURE — 80076 HEPATIC FUNCTION PANEL: CPT | Performed by: NURSE PRACTITIONER

## 2023-03-29 PROCEDURE — 36415 COLL VENOUS BLD VENIPUNCTURE: CPT

## 2023-03-29 PROCEDURE — 84439 ASSAY OF FREE THYROXINE: CPT | Performed by: NURSE PRACTITIONER

## 2023-04-04 DIAGNOSIS — F41.1 GENERALIZED ANXIETY DISORDER: Chronic | ICD-10-CM

## 2023-04-06 DIAGNOSIS — F41.1 GENERALIZED ANXIETY DISORDER: Chronic | ICD-10-CM

## 2023-04-06 RX ORDER — CLONAZEPAM 0.5 MG/1
0.5 TABLET ORAL 2 TIMES DAILY PRN
Qty: 60 TABLET | Refills: 1 | Status: SHIPPED | OUTPATIENT
Start: 2023-04-06

## 2023-04-06 RX ORDER — CLONAZEPAM 0.5 MG/1
0.5 TABLET ORAL 2 TIMES DAILY PRN
Qty: 60 TABLET | Refills: 1 | Status: CANCELLED | OUTPATIENT
Start: 2023-04-06

## 2023-04-29 DIAGNOSIS — F31.60 BIPOLAR AFFECTIVE DISORDER, MIXED: Chronic | ICD-10-CM

## 2023-05-01 RX ORDER — OXCARBAZEPINE 300 MG/1
TABLET, FILM COATED ORAL
Qty: 180 TABLET | Refills: 0 | Status: SHIPPED | OUTPATIENT
Start: 2023-05-01

## 2023-06-07 ENCOUNTER — TELEPHONE (OUTPATIENT)
Dept: PSYCHIATRY | Facility: CLINIC | Age: 38
End: 2023-06-07

## 2023-06-07 NOTE — TELEPHONE ENCOUNTER
Pt had left a vm to reschedule with provider. Called pt back and left a vm for them to call the office.

## 2023-08-07 DIAGNOSIS — F41.1 GENERALIZED ANXIETY DISORDER: Chronic | ICD-10-CM

## 2023-08-07 RX ORDER — CLONAZEPAM 1 MG/1
1 TABLET ORAL 2 TIMES DAILY PRN
Qty: 60 TABLET | Refills: 2 | OUTPATIENT
Start: 2023-08-07

## 2023-08-07 RX ORDER — CLONAZEPAM 1 MG/1
1 TABLET ORAL 2 TIMES DAILY PRN
Qty: 60 TABLET | Refills: 2 | Status: SHIPPED | OUTPATIENT
Start: 2023-08-07

## 2023-08-07 NOTE — TELEPHONE ENCOUNTER
Pt called for a refill.Pt stated on his  last visit it was discussed about  increase. Please advise

## 2023-08-11 DIAGNOSIS — F31.60 BIPOLAR AFFECTIVE DISORDER, MIXED: Chronic | ICD-10-CM

## 2023-08-14 RX ORDER — OXCARBAZEPINE 300 MG/1
300 TABLET, FILM COATED ORAL 2 TIMES DAILY
Qty: 180 TABLET | Refills: 0 | Status: SHIPPED | OUTPATIENT
Start: 2023-08-14

## 2023-10-25 DIAGNOSIS — F31.60 BIPOLAR AFFECTIVE DISORDER, MIXED: Chronic | ICD-10-CM

## 2023-10-25 RX ORDER — ZIPRASIDONE HYDROCHLORIDE 40 MG/1
CAPSULE ORAL
Qty: 90 CAPSULE | Refills: 2 | Status: SHIPPED | OUTPATIENT
Start: 2023-10-25

## 2023-10-25 RX ORDER — OXCARBAZEPINE 300 MG/1
300 TABLET, FILM COATED ORAL 2 TIMES DAILY
Qty: 180 TABLET | Refills: 0 | Status: SHIPPED | OUTPATIENT
Start: 2023-10-25

## 2024-01-08 ENCOUNTER — TELEMEDICINE (OUTPATIENT)
Dept: PSYCHIATRY | Facility: CLINIC | Age: 39
End: 2024-01-08
Payer: COMMERCIAL

## 2024-01-08 ENCOUNTER — TELEPHONE (OUTPATIENT)
Dept: PSYCHIATRY | Facility: CLINIC | Age: 39
End: 2024-01-08

## 2024-01-08 DIAGNOSIS — F31.72 BIPOLAR DISORDER, IN FULL REMISSION, MOST RECENT EPISODE HYPOMANIC: Primary | Chronic | ICD-10-CM

## 2024-01-08 DIAGNOSIS — F41.1 GENERALIZED ANXIETY DISORDER: Chronic | ICD-10-CM

## 2024-01-08 NOTE — TELEPHONE ENCOUNTER
"Patient's wife called, over the weekend she had to call the police as patient is Manic, he spoke of \"grabbing a gun\" as well as saying many other things, wife is in fear. Police came but did not take him to group home, he was able to go with his parents (Both patient and parents are claiming he is fine per wife) He has drained their savings account and is working all night long. He has been off his medications for awhile and wife is not sure how long though. Patient was last seen virtually by this office 7/19/2023 and last refills sent in were 10/25/2023.  Patient's wife is fearful of him and she was tearful. Wife said that police told her she could obtain a \"Mental Health Warrant\" from mental health provider that would force patient to take his medications?   (No info was given to wife, no ANNIE, however she is listed as his emergency contact in chart)    If you wish to contact wife, her name is Darby [phone]       Please Advise?      Thank You  "

## 2024-01-08 NOTE — PROGRESS NOTES
"Subjective   Basil Alarcon Jr is a 38 y.o.white male who presents today for follow up via telehealth    This provider is located in [place], [place] using a secure [employer] Video Visit through [employer]. Patient is being seen remotely via telehealth at their home address in [place], and stated they are in a secure environment for this session. The patient's condition being diagnosed/treated is appropriate for telemedicine. The provider identified herself as well as her credentials.   The patient, and/or patients guardian, consent to be seen remotely, and when consent is given they understand that the consent allows for patient identifiable information to be sent to a third party as needed.   They may refuse to be seen remotely at any time. The electronic data is encrypted and password protected, and the patient and/or guardian has been advised of the potential risks to privacy not withstanding such measures.   PT Identifiers used: Name and .    You have chosen to receive care through a telehealth visit.  Do you consent to use a video/audio connection for your medical care today? Yes      Chief Complaint:  Bipolar, anxiety    History of Present Illness:    from his wife in October and was not able to get his Rx filled right away, so no meds since October and reports feeling great. \"I feel more grounded and healthy\".  He reports sleeping well and no panic attacks.     His wife called the office today and reported that he was manic and had spent a lot of their savings. She also reported that he had threatened a gun.  Patient reports that when he sold his house, he put $8500 into their savings and he simply pulled it back out of the savings for his  fees, did not spend it, denies being manic.  He also denies any threats to her or himself, reports that she was referencing a conversation from 3 weeks ago when he was very frustrated with her and said \"you make me want to pull my hair our or shoot " "myself in the face\".     \"We had a really compllicated relationship, it felt toxic and neither of us were happy  He has an appt with a new counselor, first appt today at 5 pm (Gilberto Aguilar)   Starting the divorce proceedings, has a consult with an  in [place] tomorrow.     He is currently staying with his parents in [place], [place], he and his son each have their own room there.  Patient reports that his parents are aware that he is off his meds and keeping an eye on him but have no concerns.       Patient reports that his PCP first dx him with Bipolar b/c he failed on an SSRI, but at the time he just went through a break up and was struggling.     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     He stopped the levothyroxine because levels were good and his anxiety got a lot better.      He is only taking the Trileptal and Geodon, rarely takes the Klonopin b/c doesn't want to get addicted  Geodon controls the bipolar sxs better than the Vraylar, takes a nap in the afternoon.  Got remarried in January 2022 and she got pregnant right away, his first child, she has a 4yr old daughter.   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    Denies SI/HI  Sleeping well   No yvonne or hypomania.               The following portions of the patient's history were reviewed and updated as appropriate: allergies, current medications, past family history, past medical history, past social history, past surgical history and problem list.    PAST PSYCHIATRIC HISTORY  Axis I  Affective/Bipoloar Disorder, Anxiety Disorder  Axis II  None    PAST OUTPATIENT TREATMENT  Diagnosis treated:  Affective Disorder, Anxiety Disorder  Treatment Type:  Individual Therapy, Medication " Management  Genesight testing done March 2019  Prior Psychiatric Medications:  Prozac, depression worse  Paxil  Lexapro  Buspar  Hydroxyzine, felt weird   Abilfiy  Ambien, felt weird  Vraylar, made his chest tight   Trileptal  Klonopin  Geodon  Viibryd  Support Groups:  None  Sequelae Of Mental Disorder:  job disruption, family disruption, emotional distress, financial hardships      Interval History  Improved    Side Effects  Lexapro triggered yvonne    Past psychiatric history reviewed and compared to 7/19/23 visit and appropriate updates were made.    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 Collado, Cardio    Urinary tract infection 09/01/2013    [facility]       Social History:  Social History     Socioeconomic History    Marital status:      Spouse name: CRISTIAN PARIKH   Tobacco Use    Smoking status: Former     Packs/day: .5     Types: Cigarettes    Smokeless tobacco: Never   Vaping Use    Vaping Use: Never used   Substance and Sexual Activity    Alcohol use: No    Drug use: Yes     Types: Marijuana     Comment: DAILY;  LAST USE 3/16/22 PM    Sexual activity: Yes     Partners: Female     Comment: DENIES HIGH-RISK SEXUAL BEHAVIORS       Family History:  Family History   Problem Relation Age of Onset    Depression  Mother     Thyroid disease Mother     Anxiety disorder Mother     Bipolar disorder Mother     Mental illness Mother     Thyroid disease Sister     Mental illness Sister     Anxiety disorder Sister     Depression Sister     Bipolar disorder Sister        Past Surgical History:  History reviewed. No pertinent surgical history.    Problem List:  Patient Active Problem List   Diagnosis    Generalized anxiety disorder    Genital herpes    Hypercholesterolemia    Hypothyroidism, adult    Bipolar disorder, in partial remission, most recent episode mixed    Bipolar 2 disorder       Allergy:   No Known Allergies     Discontinued Medications:  There are no discontinued medications.          Current Medications:   Current Outpatient Medications   Medication Sig Dispense Refill    clonazePAM (KlonoPIN) 1 MG tablet Take 1 tablet by mouth 2 (Two) Times a Day As Needed for Anxiety. 60 tablet 2    OXcarbazepine (TRILEPTAL) 300 MG tablet Take 1 tablet by mouth 2 (Two) Times a Day. 180 tablet 0    valACYclovir (VALTREX) 500 MG tablet Take 1 tablet by mouth 3 (Three) Times a Day As Needed. prn      ziprasidone (GEODON) 40 MG capsule Take one capsule by mouth every morning and two capsules at dinner 90 capsule 2     No current facility-administered medications for this visit.         Review of Symptoms:    Psychiatric/Behavioral: Negative for agitation, behavioral problems, confusion, decreased concentration, dysphoric mood, hallucinations, self-injury, sleep disturbance and suicidal ideas. The patient is not nervous/anxious and is not hyperactive.        Physical Exam:   There were no vitals taken for this visit.    Mental Status Exam:   Hygiene: Good  Cooperation:  Cooperative  Eye Contact: Good  Psychomotor Behavior:  Appropriate  Affect:  Full range  Mood: normal  Hopelessness: Denies  Speech:  Normal  Thought Process:  Goal directed  Thought Content:  Normal  Suicidal:  None  Homicidal:  None  Hallucinations:  None  Delusion:   None  Memory:  Intact  Orientation:  Person, Place, Time and Situation  Reliability:  good  Insight:  Good  Judgement:  Good  Impulse Control:  Good  Physical/Medical Issues:  Yes Hypothyroidism      Mental status exam was reviewed and compared to 7/19/23 visit and appropriate updates were made.    PHQ-9 Depression Screening  Little interest or pleasure in doing things? (P) 0-->not at all   Feeling down, depressed, or hopeless? (P) 0-->not at all   Trouble falling or staying asleep, or sleeping too much? (P) 0-->not at all   Feeling tired or having little energy? (P) 0-->not at all   Poor appetite or overeating? (P) 1-->several days   Feeling bad about yourself - or that you are a failure or have let yourself or your family down? (P) 1-->several days   Trouble concentrating on things, such as reading the newspaper or watching television? (P) 0-->not at all   Moving or speaking so slowly that other people could have noticed? Or the opposite - being so fidgety or restless that you have been moving around a lot more than usual? (P) 0-->not at all   Thoughts that you would be better off dead, or of hurting yourself in some way? (P) 0-->not at all   PHQ-9 Total Score (P) 2   If you checked off any problems, how difficult have these problems made it for you to do your work, take care of things at home, or get along with other people? (P) not difficult at all         Former smoker    I advised Basil of the risks of tobacco use.     Lab Results:   No visits with results within 3 Month(s) from this visit.   Latest known visit with results is:   Lab on 03/29/2023   Component Date Value Ref Range Status    Total Protein 03/29/2023 7.1  6.0 - 8.5 g/dL Final    Albumin 03/29/2023 4.7  3.5 - 5.2 g/dL Final    ALT (SGPT) 03/29/2023 45 (H)  1 - 41 U/L Final    AST (SGOT) 03/29/2023 22  1 - 40 U/L Final    Alkaline Phosphatase 03/29/2023 68  39 - 117 U/L Final    Total Bilirubin 03/29/2023 0.3  0.0 - 1.2 mg/dL Final    Bilirubin,  Direct 03/29/2023 <0.2  0.0 - 0.3 mg/dL Final    Bilirubin, Indirect 03/29/2023    Final    Unable to calculate    TSH 03/29/2023 6.820 (H)  0.270 - 4.200 uIU/mL Final    Free T4 03/29/2023 0.76 (L)  0.93 - 1.70 ng/dL Final       Assessment & Plan   Problems Addressed this Visit          Mental Health    Generalized anxiety disorder (Chronic)    Bipolar disorder, in partial remission, most recent episode mixed - Primary (Chronic)     Diagnoses         Codes Comments    Bipolar disorder, in full remission, most recent episode hypomanic    -  Primary ICD-10-CM: F31.72  ICD-9-CM: 296.80     Generalized anxiety disorder     ICD-10-CM: F41.1  ICD-9-CM: 300.02             Visit Diagnoses:    ICD-10-CM ICD-9-CM   1. Bipolar disorder, in full remission, most recent episode hypomanic  F31.72 296.80   2. Generalized anxiety disorder  F41.1 300.02           TREATMENT PLAN/GOALS: Continue supportive psychotherapy efforts and medications as indicated. Treatment and medication options discussed during today's visit. Patient ackowledged and verbally consented to continue with current treatment plan and was educated on the importance of compliance with treatment and follow-up appointments.    MEDICATION ISSUES:  INSPECT reviewed as expected  Discussed medication options and treatment plan of prescribed medication as well as the risks, benefits, and side effects including potential falls, possible impaired driving and metabolic adversities among others. Patient is agreeable to call the office with any worsening of symptoms or onset of side effects. Patient is agreeable to call 911 or go to the nearest ER should he/she begin having SI/HI. No medication side effects or related complaints today.     Patient has been off all of his medication since October, has a period where he could not get the meds for a week or so when he first  from his wife and has felt better than ever, sleeping well, less anxious, no panic attacks, no  yvonne or hypomania sxs.    For now, continue off Trileptal, Geodon and Klonopin.   Patient was made aware of his wife's concerns.      MEDS ORDERED DURING VISIT:  No orders of the defined types were placed in this encounter.      Return in about 4 weeks (around 2/5/2024) for video visit.      This document has been electronically signed by Kristy Reese PA-C  January 17, 2024 06:03 EST

## 2024-01-17 ENCOUNTER — TELEPHONE (OUTPATIENT)
Dept: PSYCHIATRY | Facility: CLINIC | Age: 39
End: 2024-01-17
Payer: COMMERCIAL

## 2024-01-17 NOTE — TELEPHONE ENCOUNTER
Pt called stated that he has court tomorrow pt is wanting to know can you write a letter with his diagnosis and that he has a follow up appointment with his progress

## 2024-01-17 NOTE — LETTER
January 18, 2024    Basil Alarcon Jr    Patient: Basil Alarcon Jr   YOB: 1985   Date of Visit: 01/08/2024       To Whom it May concern,     Basil Alarcon Jr has been a patient of [facility] since September 2019 for treatment of Generalized anxiety disorder and symptoms of Bipolar disorder.  He has always been compliant with his treatment plan and follow ups.  He was seen on 1/8/24 and is scheduled for another follow up on 2/8/24.    Please feel free to reach out to the office should you have any further questions or need more information.        Sincerely,      Kristy Reese PA-C        CC: No Recipients

## 2024-02-07 ENCOUNTER — OFFICE VISIT (OUTPATIENT)
Dept: FAMILY MEDICINE CLINIC | Facility: CLINIC | Age: 39
End: 2024-02-07
Payer: COMMERCIAL

## 2024-02-07 VITALS
WEIGHT: 134 LBS | OXYGEN SATURATION: 96 % | SYSTOLIC BLOOD PRESSURE: 126 MMHG | TEMPERATURE: 98.2 F | HEART RATE: 84 BPM | HEIGHT: 67 IN | BODY MASS INDEX: 21.03 KG/M2 | DIASTOLIC BLOOD PRESSURE: 80 MMHG

## 2024-02-07 DIAGNOSIS — R74.8 ELEVATED LIVER ENZYMES: ICD-10-CM

## 2024-02-07 DIAGNOSIS — E03.9 HYPOTHYROIDISM, ADULT: ICD-10-CM

## 2024-02-07 DIAGNOSIS — E78.00 HYPERCHOLESTEROLEMIA: Primary | ICD-10-CM

## 2024-02-07 DIAGNOSIS — Z00.00 PREVENTATIVE HEALTH CARE: ICD-10-CM

## 2024-02-07 RX ORDER — TRIAMCINOLONE ACETONIDE 1 MG/G
1 OINTMENT TOPICAL 2 TIMES DAILY
Qty: 80 G | Refills: 1 | Status: SHIPPED | OUTPATIENT
Start: 2024-02-07

## 2024-02-07 RX ORDER — PANTOPRAZOLE SODIUM 20 MG/1
20 TABLET, DELAYED RELEASE ORAL DAILY
Qty: 90 TABLET | Refills: 1 | Status: SHIPPED | OUTPATIENT
Start: 2024-02-07

## 2024-02-07 RX ORDER — CHOLECALCIFEROL (VITAMIN D3) 25 MCG
1000 CAPSULE ORAL DAILY
Qty: 30 CAPSULE | Refills: 11 | Status: SHIPPED | OUTPATIENT
Start: 2024-02-07 | End: 2025-02-06

## 2024-02-07 NOTE — PATIENT INSTRUCTIONS
I willFasting blood work  Protonix 20 mg daily  Avoid caffeine, acidic foods and spicy foods  Eat more frequent smaller meals  Vitamin D3 1000 mg daily  Triamcinolone cream twice a day to rash as needed  Keep appointment for CT and follow-up with ophthalmology  Keep appointment with psychiatry

## 2024-02-07 NOTE — PROGRESS NOTES
Basil Alarcon Jr is a 38 y.o. male.     History of Present Illness  38-year-old white male with history of genital herpes, hypothyroidism, hypercholesterolemia and anxiety/bipolar disorder who goes to psychiatry who comes in today to be established as new patient    Blood pressure 150/80 heart rate 84 he denies any chest pain, dyspnea tachycardia or dizziness patient currently off all his medications since he states he has been feeling better and does have a follow-up appointment with psychiatry    Patient's main complaint is reflux normally in the morning after drinking coffee.  He does not really actually eat meals  Not sure he is eating very frequently.  Which means he is leaving his stomach empty.  Will place him on some Protonix advised him to eat more frequent smaller meals and to eat something before he goes to bed since this reflux seems to be worse in the morning.  We reviewed things that can make reflux worse such as caffeine tomato-based acid products such as juice and spicy foods    Patient oriented Ruby right eye.  He recently has been ophthalmology and since January has had problems with his left eye turning in so it is like lazy eye.  They have a CT scheduled to make sure that nothing going on causing to do this.  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  Apparently he got some new types of bugs and that he is allergic to has been getting rash and hives.  I gave him some steroid cream to use twice a day    There was some blood work in chart that was taken in March 2023 which showed elevated liver enzymes.  Patient is fasting we will be checking labs today.  His vitamin D level was also low we will place him on vitamin D.  Weight is 134 for BMI of 21.  He has had the original 2 COVID vaccines        Fasting blood work  Protonix 20 mg daily  Avoid caffeine, acidic foods and spicy foods  Eat more frequent smaller  "meals  Vitamin D3 1000 mg daily  Triamcinolone cream twice a day to rash as needed  Keep appointment for CT and follow-up with ophthalmology  Keep appointment with psychiatry         The following portions of the patient's history were reviewed and updated as appropriate: allergies, current medications, past family history, past medical history, past social history, past surgical history, and problem list.    Vitals:    02/07/24 1441 02/07/24 1445   BP: 158/80 126/80   BP Location: Right arm Right arm   Patient Position: Sitting Sitting   Cuff Size: Adult Adult   Pulse: 84    Temp: 98.2 °F (36.8 °C)    TempSrc: Infrared    SpO2: 96%    Weight: 60.8 kg (134 lb)    Height: 170.2 cm (67.01\")      Body mass index is 20.98 kg/m².    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 Collado, Cardio    Urinary tract infection 09/01/2013    [facility]     No past surgical history on file.  Family History   Problem Relation Age of Onset    Depression Mother     Thyroid disease Mother     Anxiety disorder Mother     Bipolar disorder Mother     Mental illness Mother     Thyroid disease Sister     Mental illness Sister     Anxiety disorder Sister     Depression Sister     Bipolar disorder Sister      Immunization History   Administered Date(s) Administered    COVID-19 (PFIZER) " Purple Cap Monovalent 04/14/2021, 05/06/2021       Lab on 03/29/2023   Component Date Value Ref Range Status    Total Protein 03/29/2023 7.1  6.0 - 8.5 g/dL Final    Albumin 03/29/2023 4.7  3.5 - 5.2 g/dL Final    ALT (SGPT) 03/29/2023 45 (H)  1 - 41 U/L Final    AST (SGOT) 03/29/2023 22  1 - 40 U/L Final    Alkaline Phosphatase 03/29/2023 68  39 - 117 U/L Final    Total Bilirubin 03/29/2023 0.3  0.0 - 1.2 mg/dL Final    Bilirubin, Direct 03/29/2023 <0.2  0.0 - 0.3 mg/dL Final    Bilirubin, Indirect 03/29/2023    Final    Unable to calculate    TSH 03/29/2023 6.820 (H)  0.270 - 4.200 uIU/mL Final    Free T4 03/29/2023 0.76 (L)  0.93 - 1.70 ng/dL Final         Review of Systems   Constitutional: Negative.    HENT: Negative.     Eyes:  Positive for visual disturbance.   Respiratory: Negative.     Cardiovascular: Negative.    Gastrointestinal: Negative.  Positive for GERD.   Genitourinary: Negative.    Musculoskeletal: Negative.    Skin:  Positive for rash.   Neurological: Negative.    Psychiatric/Behavioral: Negative.         Objective   Physical Exam  Constitutional:       Appearance: Normal appearance.   HENT:      Head: Normocephalic.   Cardiovascular:      Rate and Rhythm: Normal rate and regular rhythm.      Pulses: Normal pulses.      Heart sounds: Normal heart sounds.   Pulmonary:      Effort: Pulmonary effort is normal.      Breath sounds: Normal breath sounds.   Abdominal:      General: Bowel sounds are normal.   Musculoskeletal:         General: Normal range of motion.   Skin:     General: Skin is warm.   Neurological:      General: No focal deficit present.      Mental Status: He is alert and oriented to person, place, and time.   Psychiatric:         Mood and Affect: Mood normal.         Behavior: Behavior normal.         Procedures    Assessment & Plan   Diagnoses and all orders for this visit:    1. Hypercholesterolemia (Primary)  -     Lipid Panel With LDL / HDL Ratio    2. Hypothyroidism, adult  -      TSH+Free T4  -     T3    3. Preventative health care  -     CBC & Differential  -     Hemoglobin A1c    4. Elevated liver enzymes  -     Comprehensive Metabolic Panel    Other orders  -     pantoprazole (Protonix) 20 MG EC tablet; Take 1 tablet by mouth Daily.  Dispense: 90 tablet; Refill: 1  -     Cholecalciferol (D3 High Potency) 25 MCG (1000 UT) capsule; Take 1 capsule by mouth Daily.  Dispense: 30 capsule; Refill: 11  -     triamcinolone (KENALOG) 0.1 % ointment; Apply 1 Application topically to the appropriate area as directed 2 (Two) Times a Day.  Dispense: 80 g; Refill: 1           Current Outpatient Medications:     Cholecalciferol (D3 High Potency) 25 MCG (1000 UT) capsule, Take 1 capsule by mouth Daily., Disp: 30 capsule, Rfl: 11    clonazePAM (KlonoPIN) 1 MG tablet, Take 1 tablet by mouth 2 (Two) Times a Day As Needed for Anxiety. (Patient not taking: Reported on 2/7/2024), Disp: 60 tablet, Rfl: 2    OXcarbazepine (TRILEPTAL) 300 MG tablet, Take 1 tablet by mouth 2 (Two) Times a Day. (Patient not taking: Reported on 2/7/2024), Disp: 180 tablet, Rfl: 0    pantoprazole (Protonix) 20 MG EC tablet, Take 1 tablet by mouth Daily., Disp: 90 tablet, Rfl: 1    triamcinolone (KENALOG) 0.1 % ointment, Apply 1 Application topically to the appropriate area as directed 2 (Two) Times a Day., Disp: 80 g, Rfl: 1    valACYclovir (VALTREX) 500 MG tablet, Take 1 tablet by mouth 3 (Three) Times a Day As Needed. prn (Patient not taking: Reported on 2/7/2024), Disp: , Rfl:     ziprasidone (GEODON) 40 MG capsule, Take one capsule by mouth every morning and two capsules at dinner (Patient not taking: Reported on 2/7/2024), Disp: 90 capsule, Rfl: 2           Trinidad De La Paz, APRN 2/7/2024 15:14 EST  This note has been electronically signed

## 2024-02-10 DIAGNOSIS — E03.9 HYPOTHYROIDISM, UNSPECIFIED TYPE: Primary | ICD-10-CM

## 2024-02-10 LAB
ALBUMIN SERPL-MCNC: 4.8 G/DL (ref 4.1–5.1)
ALBUMIN/GLOB SERPL: 2.1 {RATIO} (ref 1.2–2.2)
ALP SERPL-CCNC: 59 IU/L (ref 44–121)
ALT SERPL-CCNC: 29 IU/L (ref 0–44)
AST SERPL-CCNC: 19 IU/L (ref 0–40)
BASOPHILS # BLD AUTO: 0.1 X10E3/UL (ref 0–0.2)
BASOPHILS NFR BLD AUTO: 1 %
BILIRUB SERPL-MCNC: 0.4 MG/DL (ref 0–1.2)
BUN SERPL-MCNC: 17 MG/DL (ref 6–20)
BUN/CREAT SERPL: 15 (ref 9–20)
CALCIUM SERPL-MCNC: 9.6 MG/DL (ref 8.7–10.2)
CHLORIDE SERPL-SCNC: 102 MMOL/L (ref 96–106)
CHOLEST SERPL-MCNC: 198 MG/DL (ref 100–199)
CO2 SERPL-SCNC: 24 MMOL/L (ref 20–29)
CREAT SERPL-MCNC: 1.1 MG/DL (ref 0.76–1.27)
EGFRCR SERPLBLD CKD-EPI 2021: 88 ML/MIN/1.73
EOSINOPHIL # BLD AUTO: 0.1 X10E3/UL (ref 0–0.4)
EOSINOPHIL NFR BLD AUTO: 1 %
ERYTHROCYTE [DISTWIDTH] IN BLOOD BY AUTOMATED COUNT: 12 % (ref 11.6–15.4)
GLOBULIN SER CALC-MCNC: 2.3 G/DL (ref 1.5–4.5)
GLUCOSE SERPL-MCNC: 94 MG/DL (ref 70–99)
HBA1C MFR BLD: 5.3 % (ref 4.8–5.6)
HCT VFR BLD AUTO: 50 % (ref 37.5–51)
HDLC SERPL-MCNC: 57 MG/DL
HGB BLD-MCNC: 16.8 G/DL (ref 13–17.7)
IMM GRANULOCYTES # BLD AUTO: 0 X10E3/UL (ref 0–0.1)
IMM GRANULOCYTES NFR BLD AUTO: 0 %
LDLC SERPL CALC-MCNC: 129 MG/DL (ref 0–99)
LDLC/HDLC SERPL: 2.3 RATIO (ref 0–3.6)
LYMPHOCYTES # BLD AUTO: 1.8 X10E3/UL (ref 0.7–3.1)
LYMPHOCYTES NFR BLD AUTO: 25 %
MCH RBC QN AUTO: 30.9 PG (ref 26.6–33)
MCHC RBC AUTO-ENTMCNC: 33.6 G/DL (ref 31.5–35.7)
MCV RBC AUTO: 92 FL (ref 79–97)
MONOCYTES # BLD AUTO: 0.6 X10E3/UL (ref 0.1–0.9)
MONOCYTES NFR BLD AUTO: 8 %
NEUTROPHILS # BLD AUTO: 4.7 X10E3/UL (ref 1.4–7)
NEUTROPHILS NFR BLD AUTO: 65 %
PLATELET # BLD AUTO: 272 X10E3/UL (ref 150–450)
POTASSIUM SERPL-SCNC: 4.8 MMOL/L (ref 3.5–5.2)
PROT SERPL-MCNC: 7.1 G/DL (ref 6–8.5)
RBC # BLD AUTO: 5.44 X10E6/UL (ref 4.14–5.8)
SODIUM SERPL-SCNC: 140 MMOL/L (ref 134–144)
T3 SERPL-MCNC: 149 NG/DL (ref 71–180)
T4 FREE SERPL-MCNC: 1.09 NG/DL (ref 0.82–1.77)
TRIGL SERPL-MCNC: 68 MG/DL (ref 0–149)
TSH SERPL DL<=0.005 MIU/L-ACNC: 6.56 UIU/ML (ref 0.45–4.5)
VLDLC SERPL CALC-MCNC: 12 MG/DL (ref 5–40)
WBC # BLD AUTO: 7.2 X10E3/UL (ref 3.4–10.8)

## 2024-02-10 RX ORDER — LEVOTHYROXINE SODIUM 0.03 MG/1
25 TABLET ORAL
Qty: 90 TABLET | Refills: 0 | Status: SHIPPED | OUTPATIENT
Start: 2024-02-10

## 2024-02-14 ENCOUNTER — TELEMEDICINE (OUTPATIENT)
Dept: PSYCHIATRY | Facility: CLINIC | Age: 39
End: 2024-02-14
Payer: COMMERCIAL

## 2024-02-14 DIAGNOSIS — F41.1 GENERALIZED ANXIETY DISORDER: Primary | Chronic | ICD-10-CM

## 2024-02-14 DIAGNOSIS — F31.76 BIPOLAR DISORDER, IN FULL REMISSION, MOST RECENT EPISODE DEPRESSED: Chronic | ICD-10-CM

## 2024-02-14 PROBLEM — F31.78 BIPOLAR DISORDER, IN FULL REMISSION, MOST RECENT EPISODE MIXED: Chronic | Status: ACTIVE | Noted: 2021-08-19

## 2024-03-06 ENCOUNTER — TELEPHONE (OUTPATIENT)
Dept: FAMILY MEDICINE CLINIC | Facility: CLINIC | Age: 39
End: 2024-03-06
Payer: COMMERCIAL

## 2024-03-06 NOTE — TELEPHONE ENCOUNTER
Dr Radha Parnell, OD with [facility] wishes to speak with you concerning pt.  You can reach her on her cell at [phone].

## 2024-03-07 DIAGNOSIS — H05.823 EYE MUSCLE WEAKNESS, BILATERAL: Primary | ICD-10-CM

## 2024-05-22 ENCOUNTER — TELEMEDICINE (OUTPATIENT)
Dept: PSYCHIATRY | Facility: CLINIC | Age: 39
End: 2024-05-22
Payer: COMMERCIAL

## 2024-05-22 DIAGNOSIS — F31.76 BIPOLAR DISORDER, IN FULL REMISSION, MOST RECENT EPISODE DEPRESSED: Chronic | ICD-10-CM

## 2024-05-22 DIAGNOSIS — F41.1 GENERALIZED ANXIETY DISORDER: Primary | Chronic | ICD-10-CM

## 2024-05-22 NOTE — PROGRESS NOTES
"Subjective   Basil Alarcon Jr is a 38 y.o.white male who presents today for follow up via telehealth    This provider is located in [place], [place] using a secure [facility] Video Visit through [facility]. Patient is being seen remotely via telehealth at their home address in [place], and stated they are in a secure environment for this session. The patient's condition being diagnosed/treated is appropriate for telemedicine. The provider identified herself as well as her credentials.   The patient, and/or patients guardian, consent to be seen remotely, and when consent is given they understand that the consent allows for patient identifiable information to be sent to a third party as needed.   They may refuse to be seen remotely at any time. The electronic data is encrypted and password protected, and the patient and/or guardian has been advised of the potential risks to privacy not withstanding such measures.   PT Identifiers used: Name and .    You have chosen to receive care through a telephone visit. Do you consent to use a telephone visit for your medical care today? Yes      Chief Complaint:  Bipolar, anxiety    History of Present Illness:   He was  from his wife in October and was not able to get his Rx filled right away, so no meds since 2023 and reports feeling great. \"I feel more grounded and healthy\".  He reports sleeping well and no panic attacks.   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    Divorce was just final but she keeps changing the visitation agreement that was agreed on during mediation.      \"We had a really compllicated relationship, it felt toxic and neither of us were happy  He has an appt with a new counselor (Gilberto Aguilar)   Starting the divorce proceedings, had a consult with an  in [place].     He is currently staying with his parents in [place], [place], he and his son each have their own room there.  Patient " reports that his parents are aware that he is off his meds and keeping an eye on him but have no concerns.       Patient reports that his PCP first dx him with Bipolar b/c he failed on an SSRI, but at the time he just went through a break up and was struggling.     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     He stopped the levothyroxine because levels were good and his anxiety got a lot better.      He is only taking the Trileptal and Geodon, rarely takes the Klonopin b/c doesn't want to get addicted  Geodon controls the bipolar sxs better than the Vraylar, takes a nap in the afternoon.  Got remarried in January 2022 and she got pregnant right away, his first child, she has a 4yr old daughter.   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    Denies SI/HI  Sleeping well   No yvonne or hypomania.               The following portions of the patient's history were reviewed and updated as appropriate: allergies, current medications, past family history, past medical history, past social history, past surgical history and problem list.    PAST PSYCHIATRIC HISTORY  Axis I  Affective/Bipoloar Disorder, Anxiety Disorder  Axis II  None    PAST OUTPATIENT TREATMENT  Diagnosis treated:  Affective Disorder, Anxiety Disorder  Treatment Type:  Individual Therapy, Medication Management  Genesight testing done March 2019  Prior Psychiatric Medications:  Prozac, depression worse  Paxil  Lexapro  Buspar  Hydroxyzine, felt weird   Abilfiy  Ambien, felt weird  Vraylar, made his chest tight   Trileptal  Klonopin  Geodon  Viibryd  Support Groups:  None  Sequelae Of Mental Disorder:  job disruption, family disruption, emotional distress, financial hardships      Interval History  Improved    Side  Effects  Lexapro triggered yvonne    Past psychiatric history reviewed and compared to 2/14/24 visit and appropriate updates were made.    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 Collado, Cardio    Urinary tract infection 09/01/2013    [facility]       Social History:  Social History     Socioeconomic History    Marital status:      Spouse name: CRISTIAN PARIKH   Tobacco Use    Smoking status: Former     Current packs/day: 0.50     Types: Cigarettes    Smokeless tobacco: Never   Vaping Use    Vaping status: Never Used   Substance and Sexual Activity    Alcohol use: No    Drug use: Yes     Types: Marijuana     Comment: DAILY;  LAST USE 3/16/22 PM    Sexual activity: Yes     Partners: Female     Comment: DENIES HIGH-RISK SEXUAL BEHAVIORS       Family History:  Family History   Problem Relation Age of Onset    Depression Mother     Thyroid disease Mother     Anxiety disorder Mother     Bipolar disorder Mother     Mental illness Mother     Thyroid disease Sister     Mental illness Sister     Anxiety disorder Sister     Depression Sister     Bipolar disorder Sister        Past Surgical History:  History reviewed. No pertinent surgical history.    Problem List:  Patient Active Problem List   Diagnosis    Generalized anxiety  disorder    Genital herpes    Hypercholesterolemia    Hypothyroidism, adult    Bipolar disorder, in full remission, most recent episode depressed    Bipolar 2 disorder       Allergy:   No Known Allergies     Discontinued Medications:  There are no discontinued medications.            Current Medications:   Current Outpatient Medications   Medication Sig Dispense Refill    triamcinolone (KENALOG) 0.1 % ointment Apply 1 Application topically to the appropriate area as directed 2 (Two) Times a Day. 80 g 1    valACYclovir (VALTREX) 500 MG tablet Take 1 tablet by mouth 3 (Three) Times a Day As Needed. prn (Patient not taking: Reported on 2/7/2024)       No current facility-administered medications for this visit.         Review of Symptoms:    Psychiatric/Behavioral: Negative for agitation, behavioral problems, confusion, decreased concentration, dysphoric mood, hallucinations, self-injury, sleep disturbance and suicidal ideas. The patient is not nervous/anxious and is not hyperactive.        Physical Exam:   There were no vitals taken for this visit.    Mental Status Exam:   Hygiene: Good  Cooperation:  Cooperative  Eye Contact: Good  Psychomotor Behavior:  Appropriate  Affect:  Full range  Mood: normal  Hopelessness: Denies  Speech:  Normal  Thought Process:  Goal directed  Thought Content:  Normal  Suicidal:  None  Homicidal:  None  Hallucinations:  None  Delusion:  None  Memory:  Intact  Orientation:  Person, Place, Time and Situation  Reliability:  good  Insight:  Good  Judgement:  Good  Impulse Control:  Good  Physical/Medical Issues:  Yes Hypothyroidism      Mental status exam was reviewed and compared to 2/14/24 visit and appropriate updates were made.    PHQ-9 Depression Screening  Little interest or pleasure in doing things? (P) 0-->not at all   Feeling down, depressed, or hopeless?     Trouble falling or staying asleep, or sleeping too much? (P) 0-->not at all   Feeling tired or having little energy? (P)  0-->not at all   Poor appetite or overeating? (P) 0-->not at all   Feeling bad about yourself - or that you are a failure or have let yourself or your family down? (P) 0-->not at all   Trouble concentrating on things, such as reading the newspaper or watching television? (P) 0-->not at all   Moving or speaking so slowly that other people could have noticed? Or the opposite - being so fidgety or restless that you have been moving around a lot more than usual? (P) 0-->not at all   Thoughts that you would be better off dead, or of hurting yourself in some way? (P) 0-->not at all   PHQ-9 Total Score     If you checked off any problems, how difficult have these problems made it for you to do your work, take care of things at home, or get along with other people? (P) not difficult at all         Former smoker    I advised Basil of the risks of tobacco use.     Lab Results:   No visits with results within 3 Month(s) from this visit.   Latest known visit with results is:   Office Visit on 02/07/2024   Component Date Value Ref Range Status    WBC 02/09/2024 7.2  3.4 - 10.8 x10E3/uL Final    RBC 02/09/2024 5.44  4.14 - 5.80 x10E6/uL Final    Hemoglobin 02/09/2024 16.8  13.0 - 17.7 g/dL Final    Hematocrit 02/09/2024 50.0  37.5 - 51.0 % Final    MCV 02/09/2024 92  79 - 97 fL Final    MCH 02/09/2024 30.9  26.6 - 33.0 pg Final    MCHC 02/09/2024 33.6  31.5 - 35.7 g/dL Final    RDW 02/09/2024 12.0  11.6 - 15.4 % Final    Platelets 02/09/2024 272  150 - 450 x10E3/uL Final    Neutrophil Rel % 02/09/2024 65  Not Estab. % Final    Lymphocyte Rel % 02/09/2024 25  Not Estab. % Final    Monocyte Rel % 02/09/2024 8  Not Estab. % Final    Eosinophil Rel % 02/09/2024 1  Not Estab. % Final    Basophil Rel % 02/09/2024 1  Not Estab. % Final    Neutrophils Absolute 02/09/2024 4.7  1.4 - 7.0 x10E3/uL Final    Lymphocytes Absolute 02/09/2024 1.8  0.7 - 3.1 x10E3/uL Final    Monocytes Absolute 02/09/2024 0.6  0.1 - 0.9 x10E3/uL Final     Eosinophils Absolute 02/09/2024 0.1  0.0 - 0.4 x10E3/uL Final    Basophils Absolute 02/09/2024 0.1  0.0 - 0.2 x10E3/uL Final    Immature Granulocyte Rel % 02/09/2024 0  Not Estab. % Final    Immature Grans Absolute 02/09/2024 0.0  0.0 - 0.1 x10E3/uL Final    Glucose 02/09/2024 94  70 - 99 mg/dL Final    BUN 02/09/2024 17  6 - 20 mg/dL Final    Creatinine 02/09/2024 1.10  0.76 - 1.27 mg/dL Final    EGFR Result 02/09/2024 88  >59 mL/min/1.73 Final    BUN/Creatinine Ratio 02/09/2024 15  9 - 20 Final    Sodium 02/09/2024 140  134 - 144 mmol/L Final    Potassium 02/09/2024 4.8  3.5 - 5.2 mmol/L Final    Chloride 02/09/2024 102  96 - 106 mmol/L Final    Total CO2 02/09/2024 24  20 - 29 mmol/L Final    Calcium 02/09/2024 9.6  8.7 - 10.2 mg/dL Final    Total Protein 02/09/2024 7.1  6.0 - 8.5 g/dL Final    Albumin 02/09/2024 4.8  4.1 - 5.1 g/dL Final    Globulin 02/09/2024 2.3  1.5 - 4.5 g/dL Final    A/G Ratio 02/09/2024 2.1  1.2 - 2.2 Final    Total Bilirubin 02/09/2024 0.4  0.0 - 1.2 mg/dL Final    Alkaline Phosphatase 02/09/2024 59  44 - 121 IU/L Final    AST (SGOT) 02/09/2024 19  0 - 40 IU/L Final    ALT (SGPT) 02/09/2024 29  0 - 44 IU/L Final    Total Cholesterol 02/09/2024 198  100 - 199 mg/dL Final    Triglycerides 02/09/2024 68  0 - 149 mg/dL Final    HDL Cholesterol 02/09/2024 57  >39 mg/dL Final    VLDL Cholesterol Giuseppe 02/09/2024 12  5 - 40 mg/dL Final    LDL Chol Calc (NIH) 02/09/2024 129 (H)  0 - 99 mg/dL Final    LDL/HDL RATIO 02/09/2024 2.3  0.0 - 3.6 ratio Final    Comment:                                     LDL/HDL Ratio                                              Men  Women                                1/2 Avg.Risk  1.0    1.5                                    Avg.Risk  3.6    3.2                                 2X Avg.Risk  6.2    5.0                                 3X Avg.Risk  8.0    6.1      Hemoglobin A1C 02/09/2024 5.3  4.8 - 5.6 % Final    Comment:          Prediabetes: 5.7 - 6.4            Diabetes: >6.4           Glycemic control for adults with diabetes: <7.0      TSH 02/09/2024 6.560 (H)  0.450 - 4.500 uIU/mL Final    Free T4 02/09/2024 1.09  0.82 - 1.77 ng/dL Final    T3, Total 02/09/2024 149  71 - 180 ng/dL Final       Assessment & Plan   Problems Addressed this Visit          Mental Health    Generalized anxiety disorder - Primary (Chronic)    Bipolar disorder, in full remission, most recent episode depressed (Chronic)     Diagnoses         Codes Comments    Generalized anxiety disorder    -  Primary ICD-10-CM: F41.1  ICD-9-CM: 300.02     Bipolar disorder, in full remission, most recent episode depressed     ICD-10-CM: F31.76  ICD-9-CM: 296.56             Visit Diagnoses:    ICD-10-CM ICD-9-CM   1. Generalized anxiety disorder  F41.1 300.02   2. Bipolar disorder, in full remission, most recent episode depressed  F31.76 296.56               TREATMENT PLAN/GOALS: Continue supportive psychotherapy efforts and medications as indicated. Treatment and medication options discussed during today's visit. Patient ackowledged and verbally consented to continue with current treatment plan and was educated on the importance of compliance with treatment and follow-up appointments.    MEDICATION ISSUES:  INSPECT reviewed as expected  Discussed medication options and treatment plan of prescribed medication as well as the risks, benefits, and side effects including potential falls, possible impaired driving and metabolic adversities among others. Patient is agreeable to call the office with any worsening of symptoms or onset of side effects. Patient is agreeable to call 911 or go to the nearest ER should he/she begin having SI/HI. No medication side effects or related complaints today.     Patient has been off all of his medication since October 2023, had a period where he could not get the meds for a week or so when he first  from his wife and has felt better than ever, sleeping well, less anxious, no  panic attacks, no yvonne or hypomania sxs.      For now, continue off Trileptal, Geodon and Klonopin.   His therapist suggested he research what a manic episode really looks like, patient does not feel he has ever been manic.      MEDS ORDERED DURING VISIT:  No orders of the defined types were placed in this encounter.      Return in about 3 months (around 8/22/2024) for video visit.      This document has been electronically signed by Kristy Reese PA-C  May 23, 2024 05:41 EDT    Part of this note may be an electronic transcription/translation of spoken language to printed text using the Dragon Dictation System.

## 2025-03-26 ENCOUNTER — TELEPHONE (OUTPATIENT)
Dept: PSYCHIATRY | Facility: CLINIC | Age: 40
End: 2025-03-26
Payer: COMMERCIAL

## 2025-03-26 NOTE — TELEPHONE ENCOUNTER
Patient is wanting to schedule follow up back with provider Kristy Reese. Patient states he has [employer] insurance now. Patient was advised to contact the insurance company and ask if he has a policy that is in network with our clinic and if so he was advised to ask if he needs to have a referral. If not per Vy  patient can be booked as a initial appointment. Patient stated he will contact [employer] then call our office back.